# Patient Record
Sex: FEMALE | Race: WHITE | NOT HISPANIC OR LATINO | Employment: UNEMPLOYED | URBAN - METROPOLITAN AREA
[De-identification: names, ages, dates, MRNs, and addresses within clinical notes are randomized per-mention and may not be internally consistent; named-entity substitution may affect disease eponyms.]

---

## 2017-01-02 ENCOUNTER — HOSPITAL ENCOUNTER (EMERGENCY)
Facility: HOSPITAL | Age: 22
Discharge: HOME | End: 2017-01-02
Attending: EMERGENCY MEDICINE | Admitting: EMERGENCY MEDICINE
Payer: COMMERCIAL

## 2017-01-02 VITALS
TEMPERATURE: 98.2 F | HEART RATE: 103 BPM | WEIGHT: 120 LBS | DIASTOLIC BLOOD PRESSURE: 60 MMHG | OXYGEN SATURATION: 97 % | SYSTOLIC BLOOD PRESSURE: 120 MMHG | RESPIRATION RATE: 18 BRPM

## 2017-01-02 DIAGNOSIS — H66.91 RIGHT OTITIS MEDIA: Primary | ICD-10-CM

## 2017-01-02 PROCEDURE — 99282 EMERGENCY DEPT VISIT SF MDM: CPT

## 2017-01-02 RX ORDER — AZITHROMYCIN 250 MG/1
250 TABLET, FILM COATED ORAL DAILY
Qty: 5 TABLET | Refills: 0 | Status: SHIPPED | OUTPATIENT
Start: 2017-01-02 | End: 2017-01-07

## 2017-01-02 RX ORDER — NEOMYCIN SULFATE, POLYMYXIN B SULFATE AND HYDROCORTISONE 10; 3.5; 1 MG/ML; MG/ML; [USP'U]/ML
4 SUSPENSION/ DROPS AURICULAR (OTIC) 3 TIMES DAILY
Qty: 6 ML | Refills: 0 | Status: SHIPPED | OUTPATIENT
Start: 2017-01-02 | End: 2017-01-12

## 2018-01-31 ENCOUNTER — OFFICE VISIT (OUTPATIENT)
Dept: URGENT CARE | Age: 23
End: 2018-01-31
Payer: COMMERCIAL

## 2018-01-31 VITALS
BODY MASS INDEX: 24.19 KG/M2 | HEIGHT: 59 IN | DIASTOLIC BLOOD PRESSURE: 79 MMHG | HEART RATE: 95 BPM | RESPIRATION RATE: 20 BRPM | TEMPERATURE: 98.8 F | WEIGHT: 120 LBS | OXYGEN SATURATION: 99 % | SYSTOLIC BLOOD PRESSURE: 138 MMHG

## 2018-01-31 DIAGNOSIS — J02.9 SORE THROAT: Primary | ICD-10-CM

## 2018-01-31 DIAGNOSIS — J11.1 INFLUENZA: ICD-10-CM

## 2018-01-31 LAB — S PYO AG THROAT QL: NEGATIVE

## 2018-01-31 PROCEDURE — 99213 OFFICE O/P EST LOW 20 MIN: CPT | Performed by: FAMILY MEDICINE

## 2018-01-31 RX ORDER — OSELTAMIVIR PHOSPHATE 75 MG/1
75 CAPSULE ORAL EVERY 12 HOURS SCHEDULED
Qty: 10 CAPSULE | Refills: 0 | Status: SHIPPED | OUTPATIENT
Start: 2018-01-31 | End: 2018-02-05

## 2018-01-31 RX ORDER — ALBUTEROL SULFATE 90 UG/1
AEROSOL, METERED RESPIRATORY (INHALATION)
COMMUNITY
Start: 2015-10-09 | End: 2018-01-31 | Stop reason: SDUPTHER

## 2018-02-01 NOTE — PATIENT INSTRUCTIONS
Rest and drink extra fluids  Start Tamiflu  OTC cough and cold medications as needed  Follow up with PCP if no improvement  Go to ER with worsening symptoms  Influenza   WHAT YOU NEED TO KNOW:   Influenza (the flu) is an infection caused by the influenza virus  The flu is easily spread when an infected person coughs, sneezes, or has close contact with others  You may be able to spread the flu to others for 1 week or longer after signs or symptoms appear  DISCHARGE INSTRUCTIONS:   Call 911 for any of the following:   · You have trouble breathing, and your lips look purple or blue  · You have a seizure  Return to the emergency department if:   · You are dizzy, or you are urinating less or not at all  · You have a headache with a stiff neck, and you feel tired or confused  · You have new pain or pressure in your chest     · Your symptoms, such as shortness of breath, vomiting, or diarrhea, get worse  · Your symptoms, such as fever and coughing, seem to get better, but then get worse  Contact your healthcare provider if:   · You have new muscle pain or weakness  · You have questions or concerns about your condition or care  Medicines: You may need any of the following:  · Acetaminophen  decreases pain and fever  It is available without a doctor's order  Ask how much to take and how often to take it  Follow directions  Acetaminophen can cause liver damage if not taken correctly  · NSAIDs , such as ibuprofen, help decrease swelling, pain, and fever  This medicine is available with or without a doctor's order  NSAIDs can cause stomach bleeding or kidney problems in certain people  If you take blood thinner medicine, always ask your healthcare provider if NSAIDs are safe for you  Always read the medicine label and follow directions  · Antivirals  help fight a viral infection  · Take your medicine as directed    Contact your healthcare provider if you think your medicine is not helping or if you have side effects  Tell him or her if you are allergic to any medicine  Keep a list of the medicines, vitamins, and herbs you take  Include the amounts, and when and why you take them  Bring the list or the pill bottles to follow-up visits  Carry your medicine list with you in case of an emergency  Rest  as much as you can to help you recover  Drink liquids as directed  to help prevent dehydration  Ask how much liquid to drink each day and which liquids are best for you  Prevent the spread of influenza:   · Wash your hands often  Use soap and water  Wash your hands after you use the bathroom, change a child's diapers, or sneeze  Wash your hands before you prepare or eat food  Use gel hand cleanser when soap and water are not available  Do not touch your eyes, nose, or mouth unless you have washed your hands first            · Cover your mouth when you sneeze or cough  Cough into a tissue or the bend of your arm  · Clean shared items with a germ-killing   Clean table surfaces, doorknobs, and light switches  Do not share towels, silverware, and dishes with people who are sick  Wash bed sheets, towels, silverware, and dishes with soap and water  · Wear a mask  over your mouth and nose if you are sick or are near anyone who is sick  · Stay away from others  if you are sick  · Influenza vaccine  helps prevent influenza (flu)  Everyone older than 6 months should get a yearly influenza vaccine  Get the vaccine as soon as it is available, usually in September or October each year  Follow up with your healthcare provider as directed:  Write down your questions so you remember to ask them during your visits  © 2017 [address] [employer] Information is for End User's use only and may not be sold, redistributed or otherwise used for commercial purposes   All illustrations and images included in CareNotes® are the copyrighted property of [employer]  or [employer]  The above information is an  only  It is not intended as medical advice for individual conditions or treatments  Talk to your doctor, nurse or pharmacist before following any medical regimen to see if it is safe and effective for you

## 2018-02-02 NOTE — PROGRESS NOTES
[facility] Now        NAME: Mary Yu is a 25 y o  female  : 1995    MRN: [id number]  DATE: 2018  TIME: 12:06 PM    Assessment and Plan   Sore throat [J02 9]  1  Sore throat  POCT rapid strepA   2  Influenza  oseltamivir (TAMIFLU) 75 mg capsule         Patient Instructions     Follow up with PCP in 3-5 days  Proceed to  ER if symptoms worsen  Chief Complaint     Chief Complaint   Patient presents with    Headache     for 1 day   Sore Throat    Fever         History of Present Illness   Mary Yu presents to the clinic c/o    This is a 25year old female here today with sore throat, fever, cough, and headache  Symptoms started today  She has not been taking anything thing  Review of Systems   Review of Systems   Constitutional: Positive for chills, fatigue and fever  HENT: Positive for congestion and sore throat  Respiratory: Positive for cough  Cardiovascular: Negative for chest pain  Gastrointestinal: Negative for diarrhea, nausea and vomiting  Skin: Negative for rash           Current Medications     Long-Term Prescriptions   Medication Sig Dispense Refill    neomycin-polymyxin-hydrocortisone (CORTISPORIN) 0 35%-10,000 units/mL-1% otic suspension Administer 4 drops into ears 3 (three) times a day for 10 days 6 mL 0       Current Allergies     Allergies as of 2018 - Reviewed 2018   Allergen Reaction Noted    Erythromycin  2016    Erythromycin base Nausea Only 2015    Lidocaine  2016    Other  2015            The following portions of the patient's history were reviewed and updated as appropriate: allergies, current medications, past family history, past medical history, past social history, past surgical history and problem list     Objective   /79   Pulse 95   Temp 98 8 °F (37 1 °C) (Temporal)   Resp 20   Ht 4' 11" (1 499 m)   Wt 54 4 kg (120 lb)   LMP 2018 (Approximate)   SpO2 99%   BMI 24 24 kg/m²        Physical Exam     Physical Exam   Constitutional: She is oriented to person, place, and time  She appears well-developed and well-nourished  HENT:   Head: Normocephalic  Posterior pharynx slightly erythemic no exudate, no enlarged tonsils  Neck: Normal range of motion  Neck supple  Cardiovascular: Normal rate and regular rhythm  Neurological: She is alert and oriented to person, place, and time

## 2018-10-28 ENCOUNTER — OFFICE VISIT (OUTPATIENT)
Dept: URGENT CARE | Age: 23
End: 2018-10-28
Payer: COMMERCIAL

## 2018-10-28 VITALS
TEMPERATURE: 98.4 F | OXYGEN SATURATION: 98 % | BODY MASS INDEX: 25.2 KG/M2 | RESPIRATION RATE: 16 BRPM | DIASTOLIC BLOOD PRESSURE: 78 MMHG | HEART RATE: 100 BPM | SYSTOLIC BLOOD PRESSURE: 118 MMHG | WEIGHT: 125 LBS | HEIGHT: 59 IN

## 2018-10-28 DIAGNOSIS — J01.00 ACUTE MAXILLARY SINUSITIS, RECURRENCE NOT SPECIFIED: Primary | ICD-10-CM

## 2018-10-28 PROCEDURE — 99213 OFFICE O/P EST LOW 20 MIN: CPT | Performed by: FAMILY MEDICINE

## 2018-10-28 RX ORDER — FLUTICASONE PROPIONATE 50 MCG
1 SPRAY, SUSPENSION (ML) NASAL DAILY
Qty: 16 G | Refills: 0 | Status: SHIPPED | OUTPATIENT
Start: 2018-10-28

## 2018-10-28 RX ORDER — AMOXICILLIN AND CLAVULANATE POTASSIUM 875; 125 MG/1; MG/1
1 TABLET, FILM COATED ORAL EVERY 12 HOURS SCHEDULED
Qty: 14 TABLET | Refills: 0 | Status: SHIPPED | OUTPATIENT
Start: 2018-10-28 | End: 2018-11-04

## 2018-10-28 RX ORDER — DESOGESTREL AND ETHINYL ESTRADIOL 0.15-0.03
1 KIT ORAL DAILY
COMMUNITY

## 2018-10-28 NOTE — PATIENT INSTRUCTIONS
Take antibiotics as prescribed  Use Flonase as prescribed  Age appropriate over-the-counter symptomatic treatment if needed, read all ingredients and do not duplicate medications  Stay hydrated with lots of water/fluids  Follow-up with PCP in the next few days for reexamination and to ensure resolution of symptoms  Go to the ED if any intractable fevers, unable to stay hydrated, abdominal pain, chest pain, shortness of breath, new or worsening symptoms or other concerning symptoms

## 2018-10-28 NOTE — PROGRESS NOTES
[facility] Now        NAME: Taylor Logan is a 21 y o  female  : 1995    MRN: [id number]  DATE: 2018  TIME: 7:12 PM    Assessment and Plan   Acute maxillary sinusitis, recurrence not specified [J01 00]  1  Acute maxillary sinusitis, recurrence not specified  amoxicillin-clavulanate (AUGMENTIN) 875-125 mg per tablet    fluticasone (FLONASE) 50 mcg/act nasal spray         Patient Instructions     Take antibiotics as prescribed  Use Flonase as prescribed  Age appropriate over-the-counter symptomatic treatment if needed, read all ingredients and do not duplicate medications  Stay hydrated with lots of water/fluids  Follow-up with PCP in the next few days for reexamination and to ensure resolution of symptoms  Go to the ED if any intractable fevers, unable to stay hydrated, abdominal pain, chest pain, shortness of breath, new or worsening symptoms or other concerning symptoms  Chief Complaint     Chief Complaint   Patient presents with    Sore Throat    Sinusitis         History of Present Illness       Sinusitis   This is a new problem  The current episode started in the past 7 days  The problem is unchanged  There has been no fever  Her pain is at a severity of 4/10  The pain is mild  Associated symptoms include congestion, coughing (dry, from PND), ear pain, sinus pressure and a sore throat  Pertinent negatives include no chills, diaphoresis, headaches, hoarse voice, neck pain or shortness of breath  Treatments tried: Tylenol cold and Sinus  The treatment provided mild relief  States that feels like prior sinus infections  Denies pregnancy risk    Review of Systems   Review of Systems   Constitutional: Negative for chills, diaphoresis and fever  HENT: Positive for congestion, ear pain, rhinorrhea, sinus pain, sinus pressure and sore throat  Negative for facial swelling, hoarse voice, trouble swallowing and voice change      Eyes: Negative for discharge, itching and visual disturbance  Respiratory: Positive for cough (dry, from PND)  Negative for shortness of breath and wheezing  Cardiovascular: Negative for chest pain and leg swelling  Gastrointestinal: Negative for abdominal pain, diarrhea, nausea and vomiting  Musculoskeletal: Negative for back pain and neck pain  Skin: Negative for rash  Neurological: Negative for dizziness, syncope, weakness, numbness and headaches  All other systems reviewed and are negative  Current Medications       Current Outpatient Prescriptions:     albuterol (PROVENTIL HFA,VENTOLIN HFA) 90 mcg/act inhaler, Inhale 2 puffs every 4 (four) hours as needed for wheezing , Disp: 1 Inhaler, Rfl: 0    desogestrel-ethinyl estradiol (APRI) 0 15-30 MG-MCG per tablet, Take 1 tablet by mouth daily, Disp: , Rfl:     amoxicillin-clavulanate (AUGMENTIN) 875-125 mg per tablet, Take 1 tablet by mouth every 12 (twelve) hours for 7 days, Disp: 14 tablet, Rfl: 0    fluticasone (FLONASE) 50 mcg/act nasal spray, 1 spray into each nostril daily, Disp: 16 g, Rfl: 0    neomycin-polymyxin-hydrocortisone (CORTISPORIN) 0 35%-10,000 units/mL-1% otic suspension, Administer 4 drops into ears 3 (three) times a day for 10 days, Disp: 6 mL, Rfl: 0    Current Allergies     Allergies as of 10/28/2018 - Reviewed 10/28/2018   Allergen Reaction Noted    Erythromycin  09/16/2016    Erythromycin base Nausea Only 11/16/2015    Lidocaine  09/16/2016    Other  11/16/2015            The following portions of the patient's history were reviewed and updated as appropriate: allergies, current medications, past family history, past medical history, past social history, past surgical history and problem list      Past Medical History:   Diagnosis Date    Arthritis     Asthma     Lupus     8 years    No known problems        Past Surgical History:   Procedure Laterality Date    NO PAST SURGERIES         History reviewed   No pertinent family history  Medications have been verified  Objective   /78 (BP Location: Left arm, Patient Position: Sitting, Cuff Size: Standard)   Pulse 100   Temp 98 4 °F (36 9 °C) (Temporal)   Resp 16   Ht 4' 11" (1 499 m)   Wt 56 7 kg (125 lb)   SpO2 98%   BMI 25 25 kg/m²        Physical Exam     Physical Exam   Constitutional: She is oriented to person, place, and time  She appears well-developed and well-nourished  HENT:   Head: Normocephalic and atraumatic  Right Ear: Hearing, tympanic membrane, external ear and ear canal normal  No mastoid tenderness  Left Ear: Hearing, tympanic membrane, external ear and ear canal normal  No mastoid tenderness  Mouth/Throat: Uvula is midline and mucous membranes are normal  No uvula swelling  Posterior oropharyngeal erythema present  Mild postnasal drip, mild frontal and maxillary sinus tenderness to palpation, no swelling, erythema or warmth  Boggy nasal mucosa, mild clear rhinorrhea   Eyes: Pupils are equal, round, and reactive to light  EOM are normal    Neck: Normal range of motion  Neck supple  Cardiovascular: Normal rate, regular rhythm and normal heart sounds  Pulmonary/Chest: Effort normal and breath sounds normal  No respiratory distress  She has no wheezes  Musculoskeletal: Normal range of motion  Neurological: She is alert and oriented to person, place, and time  Skin: Skin is warm and dry  No rash noted  Psychiatric: She has a normal mood and affect  Her behavior is normal    Nursing note and vitals reviewed

## 2018-12-27 ENCOUNTER — OFFICE VISIT (OUTPATIENT)
Dept: URGENT CARE | Age: 23
End: 2018-12-27
Payer: COMMERCIAL

## 2018-12-27 VITALS
TEMPERATURE: 98.4 F | DIASTOLIC BLOOD PRESSURE: 80 MMHG | HEIGHT: 59 IN | WEIGHT: 121 LBS | OXYGEN SATURATION: 99 % | RESPIRATION RATE: 16 BRPM | HEART RATE: 108 BPM | SYSTOLIC BLOOD PRESSURE: 131 MMHG | BODY MASS INDEX: 24.39 KG/M2

## 2018-12-27 DIAGNOSIS — J20.8 ACUTE BACTERIAL BRONCHITIS: Primary | ICD-10-CM

## 2018-12-27 DIAGNOSIS — B96.89 ACUTE BACTERIAL BRONCHITIS: Primary | ICD-10-CM

## 2018-12-27 PROCEDURE — 99213 OFFICE O/P EST LOW 20 MIN: CPT | Performed by: FAMILY MEDICINE

## 2018-12-27 RX ORDER — AZITHROMYCIN 250 MG/1
TABLET, FILM COATED ORAL
Qty: 6 TABLET | Refills: 0 | Status: SHIPPED | OUTPATIENT
Start: 2018-12-27 | End: 2018-12-31

## 2018-12-28 NOTE — PROGRESS NOTES
[facility] Now        NAME: Mariaelena Medina is a 21 y o  female  : 1995    MRN: [id number]  DATE: 2018  TIME: 8:45 PM    Assessment and Plan   Acute bacterial bronchitis [J20 8, B96 89]  1  Acute bacterial bronchitis  azithromycin (ZITHROMAX) 250 mg tablet         Patient Instructions       Follow up with PCP in 3-5 days  Proceed to  ER if symptoms worsen  Chief Complaint     Chief Complaint   Patient presents with    Cold Like Symptoms     pt c/o stuffy nose, headache, chest congestion and green/thick mucus x3 days, pt believes she had a fever this morning as well but did not take it          History of Present Illness       Patient here for evaluation of congestion, cough, thick green sputum  Patient's symptoms started a few days ago getting progressively worse          Review of Systems   Review of Systems      Current Medications       Current Outpatient Prescriptions:     albuterol (PROVENTIL HFA,VENTOLIN HFA) 90 mcg/act inhaler, Inhale 2 puffs every 4 (four) hours as needed for wheezing , Disp: 1 Inhaler, Rfl: 0    azithromycin (ZITHROMAX) 250 mg tablet, Take 2 tablets day 1 then 1 tab days 2-5, Disp: 6 tablet, Rfl: 0    desogestrel-ethinyl estradiol (APRI) 0 15-30 MG-MCG per tablet, Take 1 tablet by mouth daily, Disp: , Rfl:     fluticasone (FLONASE) 50 mcg/act nasal spray, 1 spray into each nostril daily, Disp: 16 g, Rfl: 0    neomycin-polymyxin-hydrocortisone (CORTISPORIN) 0 35%-10,000 units/mL-1% otic suspension, Administer 4 drops into ears 3 (three) times a day for 10 days, Disp: 6 mL, Rfl: 0    Current Allergies     Allergies as of 2018 - Reviewed 2018   Allergen Reaction Noted    Erythromycin  2016    Erythromycin base Nausea Only 2015    Lidocaine  2016    Other  2015            The following portions of the patient's history were reviewed and updated as appropriate: allergies, current medications, past family history, past medical history, past social history, past surgical history and problem list      Past Medical History:   Diagnosis Date    Arthritis     Asthma     Lupus     8 years    No known problems        Past Surgical History:   Procedure Laterality Date    NO PAST SURGERIES         No family history on file  Medications have been verified  Objective   /80 (BP Location: Left arm, Patient Position: Sitting, Cuff Size: Standard)   Pulse (!) 108   Temp 98 4 °F (36 9 °C) (Temporal)   Resp 16   Ht 4' 11" (1 499 m)   Wt 54 9 kg (121 lb)   SpO2 99%   BMI 24 44 kg/m²        Physical Exam     Physical Exam   Constitutional: She is oriented to person, place, and time  She appears well-developed and well-nourished  No distress  HENT:   Head: Normocephalic and atraumatic  Right Ear: External ear normal    Left Ear: External ear normal    Mouth/Throat: Oropharynx is clear and moist  No oropharyngeal exudate  Bilateral nasal congestion and erythema with no discharge  Eyes: Pupils are equal, round, and reactive to light  Conjunctivae and EOM are normal    Pulmonary/Chest: Effort normal  No respiratory distress  She has no wheezes  She has no rales  Coarse breath sounds bilateral upper airway   Lymphadenopathy:     She has cervical adenopathy  Neurological: She is alert and oriented to person, place, and time  Skin: Skin is warm and dry  Psychiatric: She has a normal mood and affect  Her behavior is normal    Nursing note and vitals reviewed

## 2019-12-29 ENCOUNTER — HOSPITAL ENCOUNTER (EMERGENCY)
Facility: HOSPITAL | Age: 24
Discharge: HOME | End: 2019-12-29
Attending: EMERGENCY MEDICINE | Admitting: EMERGENCY MEDICINE
Payer: COMMERCIAL

## 2019-12-29 VITALS
SYSTOLIC BLOOD PRESSURE: 114 MMHG | WEIGHT: 130.95 LBS | OXYGEN SATURATION: 96 % | RESPIRATION RATE: 16 BRPM | DIASTOLIC BLOOD PRESSURE: 73 MMHG | TEMPERATURE: 98.2 F | HEART RATE: 110 BPM | BODY MASS INDEX: 26.45 KG/M2

## 2019-12-29 DIAGNOSIS — H66.92 LEFT OTITIS MEDIA: Primary | ICD-10-CM

## 2019-12-29 LAB
BACTERIA UR QL AUTO: ABNORMAL /HPF
BILIRUB UR QL STRIP: NEGATIVE
CLARITY UR: CLEAR
COLOR UR: ABNORMAL
EXT PREG TEST URINE: NEGATIVE
EXT. CONTROL ED NAV: NORMAL
GLUCOSE UR STRIP-MCNC: NEGATIVE MG/DL
HGB UR QL STRIP.AUTO: ABNORMAL
KETONES UR STRIP-MCNC: NEGATIVE MG/DL
LEUKOCYTE ESTERASE UR QL STRIP: NEGATIVE
NITRITE UR QL STRIP: NEGATIVE
NON-SQ EPI CELLS URNS QL MICRO: ABNORMAL /HPF
PH UR STRIP.AUTO: 6 [PH]
PROT UR STRIP-MCNC: NEGATIVE MG/DL
RBC #/AREA URNS AUTO: ABNORMAL /HPF
SP GR UR STRIP.AUTO: 1.02 (ref 1–1.03)
UROBILINOGEN UR QL STRIP.AUTO: 0.2 E.U./DL
WBC #/AREA URNS AUTO: ABNORMAL /HPF

## 2019-12-29 PROCEDURE — 81025 URINE PREGNANCY TEST: CPT | Performed by: EMERGENCY MEDICINE

## 2019-12-29 PROCEDURE — 99283 EMERGENCY DEPT VISIT LOW MDM: CPT

## 2019-12-29 PROCEDURE — 99283 EMERGENCY DEPT VISIT LOW MDM: CPT | Performed by: EMERGENCY MEDICINE

## 2019-12-29 PROCEDURE — 96372 THER/PROPH/DIAG INJ SC/IM: CPT

## 2019-12-29 PROCEDURE — 81001 URINALYSIS AUTO W/SCOPE: CPT | Performed by: EMERGENCY MEDICINE

## 2019-12-29 RX ORDER — AMOXICILLIN AND CLAVULANATE POTASSIUM 875; 125 MG/1; MG/1
1 TABLET, FILM COATED ORAL ONCE
Status: DISCONTINUED | OUTPATIENT
Start: 2019-12-29 | End: 2019-12-29

## 2019-12-29 RX ORDER — AMOXICILLIN AND CLAVULANATE POTASSIUM 875; 125 MG/1; MG/1
1 TABLET, FILM COATED ORAL EVERY 12 HOURS
Qty: 20 TABLET | Refills: 0 | Status: SHIPPED | OUTPATIENT
Start: 2019-12-29 | End: 2020-01-08

## 2019-12-29 RX ORDER — KETOROLAC TROMETHAMINE 30 MG/ML
15 INJECTION, SOLUTION INTRAMUSCULAR; INTRAVENOUS ONCE
Status: COMPLETED | OUTPATIENT
Start: 2019-12-29 | End: 2019-12-29

## 2019-12-29 RX ADMIN — KETOROLAC TROMETHAMINE 15 MG: 30 INJECTION, SOLUTION INTRAMUSCULAR at 21:17

## 2019-12-29 RX ADMIN — AMOXICILLIN AND CLAVULANATE POTASSIUM 1 TABLET: 875; 125 TABLET, FILM COATED ORAL at 21:17

## 2019-12-30 NOTE — ED PROVIDER NOTES
History  Chief Complaint   Patient presents with   Parisa Micker     left ear pain; hx bronchitis and pneumonia recently and states in the last 24hours pain has worsened in left ear; also reports 5 weeks late for period and chance of pregnancy     26 y/o female presents to the ED for left ear pain  Patient states that she has had intermittent cold symptoms for 1 5 months  States that she was on antibiotics for pneumonia 1 month ago  She states that symptoms have since improved but left ear pain has worsened since yesterday  She has not tried anything for the symptoms  Denies any f/c, recent swimming/ water in ears, sore throat, or frequent ear infections  No other complaints  History provided by:  Patient  Earache   Location:  Left  Behind ear:  No abnormality  Quality:  Throbbing  Severity:  Moderate  Onset quality:  Sudden  Duration:  1 day  Timing:  Constant  Progression:  Worsening  Chronicity:  New  Context: recent URI    Relieved by:  None tried  Worsened by:  Nothing  Ineffective treatments:  None tried  Associated symptoms: congestion    Associated symptoms: no abdominal pain, no cough, no diarrhea, no fever, no headaches, no neck pain, no rash, no sore throat and no vomiting        Prior to Admission Medications   Prescriptions Last Dose Informant Patient Reported? Taking? albuterol (PROVENTIL HFA,VENTOLIN HFA) 90 mcg/act inhaler   No No   Sig: Inhale 2 puffs every 4 (four) hours as needed for wheezing     desogestrel-ethinyl estradiol (APRI) 0 15-30 MG-MCG per tablet   Yes No   Sig: Take 1 tablet by mouth daily   fluticasone (FLONASE) 50 mcg/act nasal spray   No No   Si spray into each nostril daily   neomycin-polymyxin-hydrocortisone (CORTISPORIN) 0 35%-10,000 units/mL-1% otic suspension   No No   Sig: Administer 4 drops into ears 3 (three) times a day for 10 days      Facility-Administered Medications: None       Past Medical History:   Diagnosis Date    Arthritis     Asthma     Lupus ([facility] [address] ) 8 years    No known problems        Past Surgical History:   Procedure Laterality Date    NO PAST SURGERIES         History reviewed  No pertinent family history  I have reviewed and agree with the history as documented  Social History     Tobacco Use    Smoking status: Never Smoker    Smokeless tobacco: Never Used   Substance Use Topics    Alcohol use: No    Drug use: No        Review of Systems   Constitutional: Negative for chills and fever  HENT: Positive for congestion and ear pain  Negative for sore throat  Eyes: Negative for pain and visual disturbance  Respiratory: Negative for cough, shortness of breath and wheezing  Cardiovascular: Negative for chest pain and leg swelling  Gastrointestinal: Negative for abdominal pain, diarrhea, nausea and vomiting  Genitourinary: Negative for dysuria, frequency, hematuria and urgency  Musculoskeletal: Negative for neck pain and neck stiffness  Skin: Negative for rash and wound  Neurological: Negative for weakness, numbness and headaches  Psychiatric/Behavioral: Negative for agitation and confusion  All other systems reviewed and are negative  Physical Exam  Physical Exam   Constitutional: She is oriented to person, place, and time  She appears well-developed and well-nourished  HENT:   Head: Normocephalic and atraumatic  Right Ear: Tympanic membrane normal    Left Ear: Tympanic membrane is erythematous and bulging  Mouth/Throat: Oropharynx is clear and moist    Eyes: Pupils are equal, round, and reactive to light  EOM are normal    Neck: Normal range of motion  Neck supple  Cardiovascular: Normal rate and regular rhythm  Pulmonary/Chest: Effort normal and breath sounds normal  No stridor  No respiratory distress  She has no wheezes  She has no rales  Abdominal: Soft  Bowel sounds are normal  She exhibits no distension  There is no tenderness  Musculoskeletal: Normal range of motion     Neurological: She is alert and oriented to person, place, and time  No focal deficits   Skin: Skin is warm and dry  Nursing note and vitals reviewed  Vital Signs  ED Triage Vitals [12/29/19 2048]   Temperature Pulse Respirations Blood Pressure SpO2   98 2 °F (36 8 °C) (!) 110 16 114/73 96 %      Temp Source Heart Rate Source Patient Position - Orthostatic VS BP Location FiO2 (%)   Oral Monitor Lying Right arm --      Pain Score       Worst Possible Pain           Vitals:    12/29/19 2048   BP: 114/73   Pulse: (!) 110   Patient Position - Orthostatic VS: Lying         Visual Acuity      ED Medications  Medications   ketorolac (TORADOL) injection 15 mg (15 mg Intramuscular Given 12/29/19 2117)       Diagnostic Studies  Results Reviewed     Procedure Component Value Units Date/Time    Urine Microscopic [[id number]]  (Abnormal) Collected:  12/29/19 2112    Lab Status:  Final result Specimen:  Urine, Clean Catch Updated:  12/29/19 2131     RBC, UA 0-1 /hpf      WBC, UA 1-2 /hpf      Epithelial Cells Occasional /hpf      Bacteria, UA Occasional /hpf     UA w Reflex to Microscopic w Reflex to Culture [[id number]]  (Abnormal) Collected:  12/29/19 2112    Lab Status:  Final result Specimen:  Urine, Clean Catch Updated:  12/29/19 2122     Color, UA Light Yellow     Clarity, UA Clear     Specific [place], UA 1 020     pH, UA 6 0     Leukocytes, UA Negative     Nitrite, UA Negative     Protein, UA Negative mg/dl      Glucose, UA Negative mg/dl      Ketones, UA Negative mg/dl      Urobilinogen, UA 0 2 E U /dl      Bilirubin, UA Negative     Blood, UA Trace-Intact    POCT pregnancy, urine [[id number]]  (Normal) Resulted:  12/29/19 2115    Lab Status:  Final result Updated:  12/29/19 2115     EXT PREG TEST UR (Ref: Negative) negative     Control valid                 No orders to display              Procedures  Procedures         ED Course  ED Course as of Dec 29 2133   Sun Dec 29, 2019   2101 Left ear pain intermittent x few weeks   Worse in the last 24 hours  No fever  Has a hx of frequent ear infections- last time was 1 5 yrs ago  MDM  Number of Diagnoses or Management Options  Left otitis media: new and requires workup  Diagnosis management comments: Patient with left ear pain- will give antibiotic and instruct her to follow up with her PCP    Patient reevaluated and feels improved  Discharge instructions given including medications, follow-up, and return precautions  Patient demonstrates verbal understanding and agrees with plan  Amount and/or Complexity of Data Reviewed  Clinical lab tests: ordered and reviewed  Tests in the radiology section of CPT®: ordered and reviewed  Tests in the medicine section of CPT®: ordered and reviewed  Discussion of test results with the performing providers: yes  Decide to obtain previous medical records or to obtain history from someone other than the patient: yes  Obtain history from someone other than the patient: yes  Review and summarize past medical records: yes  Discuss the patient with other providers: yes  Independent visualization of images, tracings, or specimens: yes    Patient Progress  Patient progress: improved        Disposition  Final diagnoses:   Left otitis media     Time reflects when diagnosis was documented in both MDM as applicable and the Disposition within this note     Time User Action Codes Description Comment    12/29/2019  9:30 PM Alex Ayala Add [H66 92] Left otitis media       ED Disposition     ED Disposition Condition Date/Time Comment    Discharge Stable Sun Dec 29, 2019  9:18 PM Anitha Bradford discharge to home/self care              Follow-up Information     Follow up With Specialties Details Why Contact Info Additional Information    [employer]  Call  to establish a family doctor if you do not already have one [address] [facility] Emergency Department Emergency Medicine Go to  immediately for any new or worsening symptoms  [address] [facility] [address], [address] [place], [place], [facility]          Patient's Medications   Discharge Prescriptions    AMOXICILLIN-CLAVULANATE (AUGMENTIN) 875-125 MG PER TABLET    Take 1 tablet by mouth every 12 (twelve) hours for 10 days       Start Date: 12/29/2019End Date: 1/8/2020       Order Dose: 1 tablet       Quantity: 20 tablet    Refills: 0     No discharge procedures on file      ED Provider  Electronically Signed by           Alysha Crawford DO  12/29/19 1199

## 2023-04-24 ENCOUNTER — OFFICE VISIT (OUTPATIENT)
Dept: URGENT CARE | Facility: CLINIC | Age: 28
End: 2023-04-24

## 2023-04-24 VITALS
TEMPERATURE: 98.1 F | DIASTOLIC BLOOD PRESSURE: 77 MMHG | OXYGEN SATURATION: 98 % | HEART RATE: 95 BPM | RESPIRATION RATE: 17 BRPM | SYSTOLIC BLOOD PRESSURE: 105 MMHG

## 2023-04-24 DIAGNOSIS — H66.91 RIGHT OTITIS MEDIA, UNSPECIFIED OTITIS MEDIA TYPE: Primary | ICD-10-CM

## 2023-04-24 RX ORDER — AMOXICILLIN 500 MG/1
500 CAPSULE ORAL EVERY 12 HOURS SCHEDULED
Qty: 14 CAPSULE | Refills: 0 | Status: SHIPPED | OUTPATIENT
Start: 2023-04-24 | End: 2023-05-01

## 2023-04-24 NOTE — PROGRESS NOTES
[facility]        NAME: Davis Middleton is a 32 y o  female  : 1995    MRN: [id number]  DATE: 2023  TIME: 12:23 PM    Assessment and Plan   Right otitis media, unspecified otitis media type [H66 91]  1  Right otitis media, unspecified otitis media type  amoxicillin (AMOXIL) 500 mg capsule            Patient Instructions       Follow up with PCP in 3-5 days  Proceed to  ER if symptoms worsen  Chief Complaint     Chief Complaint   Patient presents with   • Earache     Pt c/o right-sided earache and slight sore throat for 2 days  Pt had fever 101 1 yesterday and subsided with motrin  History of Present Illness       Patient is a 33 yo female who presents for evaluation of right-sided ear pain x 2 day  Also reports some sore throat and a fever of 101 last night  She denies rash, abdominal pain, nausea, vomiting, dysphagia, body aches, headaches, dizziness       Review of Systems   Review of Systems   Constitutional: Positive for fever  HENT: Positive for ear pain and sore throat  Negative for ear discharge, facial swelling, hearing loss, trouble swallowing and voice change  Gastrointestinal: Negative for abdominal pain, nausea and vomiting  Musculoskeletal: Negative for arthralgias and myalgias  Skin: Negative for rash  Neurological: Negative for dizziness and headaches           Current Medications       Current Outpatient Medications:   •  albuterol (PROVENTIL HFA,VENTOLIN HFA) 90 mcg/act inhaler, Inhale 2 puffs every 4 (four) hours as needed for wheezing , Disp: 1 Inhaler, Rfl: 0  •  amoxicillin (AMOXIL) 500 mg capsule, Take 1 capsule (500 mg total) by mouth every 12 (twelve) hours for 7 days, Disp: 14 capsule, Rfl: 0  •  desogestrel-ethinyl estradiol (APRI) 0 15-30 MG-MCG per tablet, Take 1 tablet by mouth daily, Disp: , Rfl:   •  fluticasone (FLONASE) 50 mcg/act nasal spray, 1 spray into each nostril daily, Disp: 16 g, Rfl: 0  •  neomycin-polymyxin-hydrocortisone [facility]  Progress Note - Sophia Oro 1948, 67 y o  female MRN: [id number]  Unit/Bed#: -01 Encounter: [id number]  Primary Care Provider: Cristina Ramsey MD   Date and time admitted to hospital: 3/21/2021 10:27 PM    Delirium  Assessment & Plan  Acute delirium, for which she received 16mg ativan overnight based off CIWA protocol  patient's family denies heavy alcohol abuse, she takes maybe a glass of wine occasionally, so it is unlikely she is in active alcohol withdrawal - more like severe delirium  CT head - No acute intracranial anomaly,  CIWA protocol discontinued 3/24 as patient is being overmedicated  Trial of phenobarb x 1  Patient received only one dose of fentanyl overnight, Mentation improving - more interactive this am  C/w haldol Prn  QTC interval - 468  continue with IV antibiotics for pneumonia treatment    * Sepsis ([facility] [address] )  Assessment & Plan  Sepsis, poa, a/e/b leukocytosis 36k with 4% bands and tachycardia secondary to right sided pneumonia/large right parapneumonic effusion  INR 8 77 on admission  S/p Vit K, INR now 1 27  S/p IR chest tube placement 3/23/21 - now on tpa/dornase BID x 3 days  Fluid cultures - rare gram positive cocci, pending sensitivities  On IV antibiotics - Ceftriaxone, flagyl and vancomycin  urine Legionella and Streptococcus - negative  Trend WBC and fever curve - wbc improving, 20k  Infectious disease on board  Blood cultures negative till date      Pneumonia of right lower lobe due to infectious organism  Assessment & Plan  Right lower lobe pneumonia with parapneumonic effusion  CT chest showed- No evidence of pulmonary artery embolus  Consolidation versus mass in the right lower lobe      On Rocephin, Zithromax and Flagyl  urine Legionella and Streptococcus negative  Oxygen supplementation and bronchodilators  Pulmonary on board    Pleural effusion on right  Assessment & Plan  CT chest - large right-sided complicated parapenumonic (CORTISPORIN) 0 35%-10,000 units/mL-1% otic suspension, Administer 4 drops into ears 3 (three) times a day for 10 days, Disp: 6 mL, Rfl: 0    Current Allergies     Allergies as of 04/24/2023 - Reviewed 04/24/2023   Allergen Reaction Noted   • Erythromycin  09/16/2016   • Erythromycin base Nausea Only 11/16/2015   • Lidocaine  09/16/2016   • Other  11/16/2015            The following portions of the patient's history were reviewed and updated as appropriate: allergies, current medications, past family history, past medical history, past social history, past surgical history and problem list      Past Medical History:   Diagnosis Date   • Arthritis    • Asthma    • Lupus ([address] )     8 years   • No known problems        Past Surgical History:   Procedure Laterality Date   • NO PAST SURGERIES         History reviewed  No pertinent family history  Medications have been verified  Objective   /77   Pulse 95   Temp 98 1 °F (36 7 °C)   Resp 17   SpO2 98%        Physical Exam     Physical Exam  Constitutional:       General: She is not in acute distress  Appearance: She is not toxic-appearing  HENT:      Right Ear: Tympanic membrane is erythematous  Left Ear: Tympanic membrane is not erythematous  Mouth/Throat:      Comments: No OP erythema or edema  No tonsillar enlargement  Cardiovascular:      Rate and Rhythm: Normal rate  Heart sounds: Normal heart sounds  Pulmonary:      Effort: Pulmonary effort is normal       Breath sounds: Normal breath sounds  Skin:     General: Skin is warm and dry  Findings: No rash  Neurological:      Mental Status: She is alert     Psychiatric:         Mood and Affect: Mood normal          Behavior: Behavior normal  pleural effusion  S/p IR chest tube placement 3/23/21  Chest tube drainage reduced  For trial of tpa BID x 3days  Pulmonary on board  Oxygen supplementation to keep sats > 92%    Supratherapeutic INR  Assessment & Plan  Patient with INR of 8 77 on admission, possibly secondary to severe malnutrition, Albumin was 1 8 on admission  Patient's family denies history of heavy alcohol abuse (CIWA discontinued)  Liver enzymes within normal limits, no history of liver disease  Not on any anticoagulation  S/p vitamin  K 10mg once - with rapid improvement in INR to 1 27  factor VII activity results pending      Valvular heart disease  Assessment & Plan  Patietn with multiple valvular disease - Moderate AS, mild -mod MR, moderate TR  EF 93%, grade 1 diastolic dysfunction with pulmonary hypertension, 60mmhg    Anemia  Assessment & Plan  Hb 10 1 on admission - chronic normocytic normochromic anemia  Iron panel - Chronic anemia of inflammation  Hb downtrended to 7 2 today - no signs of overt bleeding seen  Keep hb > 7, transfuse if needed    Severe protein-calorie malnutrition (HCC)  Assessment & Plan  Malnutrition Findings:   Adult Malnutrition type: Acute illness  Adult Degree of Malnutrition: Other severe protein calorie malnutritionsevere protein calorie malnutrition as evidenced by hollowed orbitals, temple hollowing, clavicle protrusion, with prominent bone and low albumin levels 1 6> 1 8, and associated coagulapathy with high INR 8 on admission in the setting of right lower lobe pneumonia with complicated right parapneumonic effusion    BMI Findings: Body mass index is 25 12 kg/m²         Thrombocytosis ([address] )  Assessment & Plan  Most likely reactive, In the setting of coagulopathy/sepsis  Plt 624 today      Coagulopathy (HCC)  Assessment & Plan  Coagulopathy possibly due to ETOH abuse, malnutrition a/e/b INR 8 77, treated with vitamin K 10mg SQ, telemetry and monitoring for signs of bleeding/CBCs  INR improved to 1 2    Acute respiratory failure with hypoxia [facility])  Assessment & Plan  Acute hypoxic resp failure, POA, a/e/b sob, hypoxia 85% on 2L >8L, tachypnea 19-22 secondary to right lower lobe pneumonia, large right parapneumonic effusion and atelectasis  S/p IR chest tube placement 3/23/21  Oxygen requirements down to 6L NC  Wean oxygen as tolerated  Goal O2 sats > 92%    HTN (hypertension)  Assessment & Plan  Home meds - 20mg lasix daily  Monitor BP per unit protocol  Hydralazine p r n  GERD (gastroesophageal reflux disease)  Assessment & Plan  On Protonix    Hypokalemia  Assessment & Plan  Monitor and replace electrolytes as needed    CAD (coronary artery disease)  Assessment & Plan  Continue on Plavix and Lipitor        VTE Pharmacologic Prophylaxis:   Pharmacologic: Enoxaparin (Lovenox)  Mechanical VTE Prophylaxis in Place: Yes    Patient Centered Rounds: I have performed bedside rounds with nursing staff today  Discussions with Specialists or Other Care Team Provider: CM< pulm, ID on board    Education and Discussions with Family / Patient: I updated patient's daughter    Time Spent for Care: 30 minutes  More than 50% of total time spent on counseling and coordination of care as described above  Current Length of Stay: 4 day(s)    Current Patient Status: Inpatient   Certification Statement: The patient will continue to require additional inpatient hospital stay due to as above    Discharge Plan: 2-3 days    Code Status: Level 1 - Full Code      Subjective:   No overnight events, no complaints this am  Mentation improving slowly  Objective:     Vitals:   Temp (24hrs), Av 4 °F (36 9 °C), Min:97 7 °F (36 5 °C), Max:98 9 °F (37 2 °C)    Temp:  [97 7 °F (36 5 °C)-98 9 °F (37 2 °C)] 98 3 °F (36 8 °C)  HR:  [] 91  Resp:  [20-25] 22  BP: ()/(33-68) 127/60  SpO2:  [90 %-100 %] 90 %  Body mass index is 25 12 kg/m²  Input and Output Summary (last 24 hours):        Intake/Output Summary (Last 24 hours) at 3/26/2021 0935  Last data filed at 3/26/2021 0600  Gross per 24 hour   Intake 1690 ml   Output 1342 ml   Net 348 ml       Physical Exam:     Physical Exam  Vitals signs and nursing note reviewed  Constitutional:       General: She is not in acute distress  Appearance: Normal appearance  She is ill-appearing  She is not toxic-appearing or diaphoretic  Cardiovascular:      Rate and Rhythm: Normal rate and regular rhythm  Pulses: Normal pulses  Heart sounds: Normal heart sounds  Pulmonary:      Effort: Pulmonary effort is normal  No respiratory distress  Breath sounds: Normal breath sounds  No stridor  No wheezing, rhonchi or rales  Chest:      Chest wall: No tenderness  Abdominal:      General: Bowel sounds are normal  There is no distension  Palpations: Abdomen is soft  Tenderness: There is no abdominal tenderness  There is no right CVA tenderness, left CVA tenderness or guarding  Musculoskeletal: Normal range of motion  General: No swelling or deformity  Right lower leg: No edema  Left lower leg: No edema  Skin:     General: Skin is warm  Capillary Refill: Capillary refill takes less than 2 seconds  Coloration: Skin is pale  Skin is not jaundiced  Findings: Bruising (RUE) and rash present  Neurological:      General: No focal deficit present  Mental Status: She is alert  She is confused  Psychiatric:         Mood and Affect: Mood normal          Speech: Speech normal          Behavior: Behavior is cooperative  Additional Data:     Labs:    Results from last 7 days   Lab Units 03/26/21  0246  03/25/21  0435   WBC Thousand/uL 20 60*  --  25 40*   HEMOGLOBIN g/dL 7 2*   < > 6 9*   HEMATOCRIT % 22 6*  --  21 4*   PLATELETS Thousands/uL 624*  --  605*   BANDS PCT %  --   --  1   LYMPHO PCT %  --   --  6*   MONO PCT %  --   --  7   EOS PCT %  --   --  1    < > = values in this interval not displayed       Results from last 7 days   Lab Units 03/26/21  0303  03/22/21  0401   SODIUM mmol/L 138   < > 133*   POTASSIUM mmol/L 3 5   < > 3 9   CHLORIDE mmol/L 103   < > 97*   CO2 mmol/L 24   < > 23   CO2, I-STAT   --    < >  --    BUN mg/dL 11   < > 17   CREATININE mg/dL 0 75   < > 0 88   ANION GAP mmol/L 11   < > 13   CALCIUM mg/dL 7 9*   < > 8 5   ALBUMIN g/dL  --   --  1 6*   TOTAL BILIRUBIN mg/dL  --   --  0 40   ALK PHOS U/L  --   --  220*   ALT U/L  --   --  24   AST U/L  --   --  30   GLUCOSE RANDOM mg/dL 79   < > 118    < > = values in this interval not displayed  Results from last 7 days   Lab Units 03/26/21  0246   INR  1 30*     Results from last 7 days   Lab Units 03/26/21  0556 03/26/21  0014   POC GLUCOSE mg/dl 74 96         Results from last 7 days   Lab Units 03/23/21  0447 03/22/21  0104 03/21/21  2254   LACTIC ACID mmol/L  --  1 4  --    PROCALCITONIN ng/ml 1 12*  --  0 80*           * I Have Reviewed All Lab Data Listed Above  * Additional Pertinent Lab Tests Reviewed: All Labs Within Last 24 Hours Reviewed    Imaging:    Imaging Reports Reviewed Today Include:   Imaging Personally Reviewed by Myself Includes:      Recent Cultures (last 7 days):     Results from last 7 days   Lab Units 03/25/21  0757 03/23/21  1743 03/21/21  2254   BLOOD CULTURE   --   --  No Growth After 4 Days  No Growth After 4 Days     GRAM STAIN RESULT   --  1+ Polys*  Rare Gram positive cocci in pairs*  2+ Polys  No No bacteria seen  --    BODY FLUID CULTURE, STERILE   --  No growth  --    LEGIONELLA URINARY ANTIGEN  Negative  --   --        Last 24 Hours Medication List:   Current Facility-Administered Medications   Medication Dose Route Frequency Provider Last Rate    acetaminophen  975 mg Oral Q6H PRN Reynmaria t Sprain, CRNP      ALPRAZolam  0 25 mg Oral Q6H PRN Allan Mixon MD      dornase sheryl (PULMOZYME) 5 mg in 30 mL SWFI chest tube/drain tube instillation  5 mg Chest Tube BID Anna De Anda PA-C      And    alteplase (TPA) 10 mg in SWFI 30 mL chest tube/drain tube instillation  10 mg Chest Tube BID Mercy Gobble, PA-C      atorvastatin  40 mg Oral Daily With Fabian Humphrey PA-C      cefepime  2,000 mg Intravenous Q8H Mercy Gobble, PA-C 2,000 mg (03/26/21 0933)    clopidogrel  75 mg Oral Daily Mercy Gobble, PA-C      dextromethorphan-guaiFENesin  10 mL Oral Q4H PRN Mercy Gobble, PA-C      enoxaparin  40 mg Subcutaneous Daily Neil Aguilar MD      folic acid IVPB  1 mg Intravenous Daily PoppyRONALDO BloomNP Stopped (03/25/21 1215)    furosemide  20 mg Oral Daily Mercy Gobble, PA-C      hydrALAZINE  10 mg Intravenous Q6H PRN Mercy Gobble, PA-C      ipratropium-albuterol  3 mL Nebulization Q6H PRN Mercy Gobble, PA-C      ipratropium-albuterol  3 mL Nebulization TID Mercy Gobble, PA-C      lidocaine  1 patch Topical Daily NICKOLAS Hwang      metroNIDAZOLE  500 mg Intravenous Q8H Mercy Gobble, PA-C Stopped (03/26/21 0440)   [facility] multivitamin-minerals  1 tablet Oral Daily Mercy Gobble, PA-C      ondansetron  4 mg Intravenous Q6H PRN Mercy Gobble, PA-C      pantoprazole  20 mg Oral Early Morning Mercy Gobble, PA-C      thiamine  100 mg Intravenous Daily NICKOLAS Darnell Stopped (03/25/21 1243)    vancomycin  15 mg/kg Intravenous Q12H Poppy Washington, RONALDONP Stopped (03/26/21 0008)        Today, Patient Was Seen By: Neil Aguilar MD    ** Please Note: Dictation voice to text software may have been used in the creation of this document   **

## 2023-10-19 ENCOUNTER — OFFICE VISIT (OUTPATIENT)
Dept: OBGYN CLINIC | Facility: CLINIC | Age: 28
End: 2023-10-19

## 2023-10-19 VITALS
BODY MASS INDEX: 24.68 KG/M2 | HEIGHT: 59 IN | WEIGHT: 122.4 LBS | DIASTOLIC BLOOD PRESSURE: 82 MMHG | HEART RATE: 93 BPM | SYSTOLIC BLOOD PRESSURE: 132 MMHG

## 2023-10-19 DIAGNOSIS — Z12.39 ENCOUNTER FOR BREAST CANCER SCREENING USING NON-MAMMOGRAM MODALITY: ICD-10-CM

## 2023-10-19 DIAGNOSIS — Z12.4 CERVICAL CANCER SCREENING: ICD-10-CM

## 2023-10-19 DIAGNOSIS — Z01.419 WOMEN'S ANNUAL ROUTINE GYNECOLOGICAL EXAMINATION: Primary | ICD-10-CM

## 2023-10-19 DIAGNOSIS — Z30.41 SURVEILLANCE FOR BIRTH CONTROL, ORAL CONTRACEPTIVES: ICD-10-CM

## 2023-10-19 PROBLEM — N80.9 ENDOMETRIOSIS: Status: ACTIVE | Noted: 2023-10-19

## 2023-10-19 PROCEDURE — 88175 CYTOPATH C/V AUTO FLUID REDO: CPT | Performed by: NURSE PRACTITIONER

## 2023-10-19 RX ORDER — NORGESTIMATE AND ETHINYL ESTRADIOL 0.25-0.035
1 KIT ORAL DAILY
Qty: 84 TABLET | Refills: 3 | Status: SHIPPED | OUTPATIENT
Start: 2023-10-19

## 2023-10-19 NOTE — PROGRESS NOTES
Catherine Nielsen is a 29 y.o. female who presents today for annual GYN exam. She is a new patient to our office, transferring care from [facility]. Her last pap smear was performed  and result was NILM. She reports no history of abnormal pap smears in her past. She received the HPV vaccine in the past. She reports a history of endometriosis which has been managed by OCP for many years. She reports that she changed her OCPs in January and since August has been having almost daily breakthrough bleeding. She has a questionable history of Lupus, per documentation from [facility] she reported "pre-lupus". Per [facility] results, she has negative OLAF, negative lupus anticoagulant and a normal thrombotic risk/acquired antiphospholipid panel. Her general medical history has been reviewed and she reports it as follows:    Past Medical History:   Diagnosis Date    Arthritis     Asthma     Endometriosis 10/19/2023    Lupus ([address])     8 years    No known problems      Past Surgical History:   Procedure Laterality Date     SECTION       SECTION      NO PAST SURGERIES       OB History          2    Para   2    Term   2            AB        Living   2         SAB        IAB        Ectopic        Multiple        Live Births                   Social History     Tobacco Use    Smoking status: Never    Smokeless tobacco: Never   Substance Use Topics    Alcohol use: No    Drug use: No     Social History     Substance and Sexual Activity   Sexual Activity Yes    Partners: Male    Birth control/protection: OCP     Cancer-related family history is negative for Breast cancer and Colon cancer.     Current Outpatient Medications   Medication Instructions    albuterol (PROVENTIL HFA,VENTOLIN HFA) 90 mcg/act inhaler 2 puffs, Inhalation, Every 4 hours PRN    fluticasone (FLONASE) 50 mcg/act nasal spray 1 spray, Nasal, Daily    neomycin-polymyxin-hydrocortisone (CORTISPORIN) 0.35%-10,000 units/mL-1% otic suspension 4 drops, Otic, 3 times daily    norgestimate-ethinyl estradiol (Sprintec 28) 0.25-35 MG-MCG per tablet 1 tablet, Oral, Daily, Take continuously, skipping placebo pills. Review of Systems:  Review of Systems   Constitutional: Negative. Gastrointestinal: Negative. Genitourinary: Negative. Skin: Negative. Physical Exam:  /82 (BP Location: Right arm)   Pulse 93   Ht 4' 11" (1.499 m)   Wt 55.5 kg (122 lb 6.4 oz)   BMI 24.72 kg/m²   Physical Exam  Constitutional:       General: She is not in acute distress. Appearance: Normal appearance. Genitourinary:      Vulva and bladder normal.      No lesions in the vagina. No vaginal erythema or ulceration. Right Adnexa: not tender and no mass present. Left Adnexa: not tender and no mass present. No cervical motion tenderness or lesion. Uterus is not enlarged or tender. No uterine mass detected. Breasts:     Right: No mass, nipple discharge or skin change. Left: No mass, nipple discharge or skin change. Cardiovascular:      Rate and Rhythm: Normal rate and regular rhythm. Pulmonary:      Effort: Pulmonary effort is normal.      Breath sounds: Normal breath sounds. Abdominal:      General: Abdomen is flat. Palpations: Abdomen is soft. Musculoskeletal:      Cervical back: Neck supple. Neurological:      Mental Status: She is alert. Skin:     General: Skin is warm and dry. Psychiatric:         Mood and Affect: Mood normal.         Behavior: Behavior normal.   Vitals reviewed. Assessment/Plan:   1. Normal well-woman GYN exam.  2. Cervical cancer screening:  Normal cervical exam.  Pap smear done. Has received HPV vaccine in the past.   3. STD screening:  Patient declines. 4. Breast cancer screening:  Normal breast exam. Reviewed breast self-awareness. 5. Depression Screening: Patient's depression screening was assessed with a PHQ-2 score of 2. Their PHQ-9 score was 2. Clinically patient does not have depression. No treatment is required. 6. BMI Counseling: Body mass index is 24.72 kg/m². Discussed the patient's BMI with her. The BMI is normal    7. Contraception:  Will change OCPs due to breakthrough bleeding. Patient has questionable history of Lupus, though has had normal thrombotic risk/acquired antiphospholipid panel, making [facility] Category 2 and safe to continue. 8. Return to office in one year for annual exam or sooner if needed. Reviewed with patient that [facility], but that they will not necessarily be reviewed by me immediately. Explained that I will review results at my earliest opportunity and contact patient appropriately.

## 2023-10-24 LAB
LAB AP GYN PRIMARY INTERPRETATION: NORMAL
Lab: NORMAL

## 2023-10-25 ENCOUNTER — TELEPHONE (OUTPATIENT)
Dept: OBGYN CLINIC | Facility: CLINIC | Age: 28
End: 2023-10-25

## 2023-10-25 NOTE — TELEPHONE ENCOUNTER
----- Message from Leeanne Cameron, [address] [place] [address] sent at 10/25/2023  8:48 AM EDT -----  Please let her know the pap is normal. Thank you!

## 2023-11-08 ENCOUNTER — OFFICE VISIT (OUTPATIENT)
Dept: URGENT CARE | Facility: CLINIC | Age: 28
End: 2023-11-08
Payer: COMMERCIAL

## 2023-11-08 VITALS
RESPIRATION RATE: 18 BRPM | SYSTOLIC BLOOD PRESSURE: 155 MMHG | HEART RATE: 96 BPM | DIASTOLIC BLOOD PRESSURE: 88 MMHG | TEMPERATURE: 97.3 F | OXYGEN SATURATION: 99 %

## 2023-11-08 DIAGNOSIS — J06.9 VIRAL URI WITH COUGH: Primary | ICD-10-CM

## 2023-11-08 PROCEDURE — 99213 OFFICE O/P EST LOW 20 MIN: CPT | Performed by: PHYSICIAN ASSISTANT

## 2023-11-08 RX ORDER — METHYLPREDNISOLONE 4 MG/1
TABLET ORAL
Qty: 21 EACH | Refills: 0 | Status: SHIPPED | OUTPATIENT
Start: 2023-11-08

## 2023-11-08 RX ORDER — UREA 10 %
500 LOTION (ML) TOPICAL DAILY
COMMUNITY

## 2023-11-08 NOTE — PROGRESS NOTES
[facility]        NAME: Margy Musa is a 29 y.o. female  : 1995    MRN: [id number]  DATE: 2023  TIME: 4:39 PM    Assessment and Plan   Viral URI with cough [J06.9]  1. Viral URI with cough  methylPREDNISolone 4 MG tablet therapy pack        Lungs CTA. SPO2 99%. Short course of Prednisone given hx of lupus     Patient Instructions       Follow up with PCP in 3-5 days. Proceed to  ER if symptoms worsen. Chief Complaint     Chief Complaint   Patient presents with   • Cough     Negative covid test this morning. Has lupas, which attacks her lungs, wants to get checked out         History of Present Illness       Patient is a 28 yo female who presents for evaluation of cough, congestion, sore throat x 1 day. Feels some chest tightness. States she has lupus and is concerned about her lungs. Negative home Covid test this AM.  Both kids have URIs. No fevers, SOB, CP. Review of Systems   Review of Systems   Constitutional:  Negative for fever. HENT:  Positive for congestion and sore throat. Respiratory:  Positive for cough and chest tightness. Negative for shortness of breath. Cardiovascular:  Negative for chest pain. Current Medications       Current Outpatient Medications:   •  albuterol (PROVENTIL HFA,VENTOLIN HFA) 90 mcg/act inhaler, Inhale 2 puffs every 4 (four) hours as needed for wheezing., Disp: 1 Inhaler, Rfl: 0  •  methylPREDNISolone 4 MG tablet therapy pack, Use as directed on package, Disp: 21 each, Rfl: 0  •  norgestimate-ethinyl estradiol (Sprintec 28) 0.25-35 MG-MCG per tablet, Take 1 tablet by mouth daily Take continuously, skipping placebo pills. , Disp: 84 tablet, Rfl: 3  •  calcium carbonate (OS-MAHENDRA) 1250 (500 Ca) MG chewable tablet, Chew 500 mg daily (Patient not taking: Reported on 2023), Disp: , Rfl:     Current Allergies     Allergies as of 2023 - Reviewed 2023   Allergen Reaction Noted   • Amoxicillin-pot clavulanate GI Intolerance 2022   • Benzocaine Swelling 04/15/2020   • Erythromycin  2016   • Erythromycin base Nausea Only 2015   • Latex Hives 2022   • Lidocaine  2016   • Other  2015            The following portions of the patient's history were reviewed and updated as appropriate: allergies, current medications, past family history, past medical history, past social history, past surgical history and problem list.     Past Medical History:   Diagnosis Date   • Arthritis    • Asthma    • Endometriosis 10/19/2023   • Lupus ([address])     8 years   • No known problems        Past Surgical History:   Procedure Laterality Date   •  SECTION     •  SECTION     • NO PAST SURGERIES         Family History   Problem Relation Age of Onset   • Breast cancer Neg Hx    • Colon cancer Neg Hx          Medications have been verified. Objective   /88   Pulse 96   Temp (!) 97.3 °F (36.3 °C)   Resp 18   SpO2 99%        Physical Exam     Physical Exam  Constitutional:       General: She is not in acute distress. Appearance: Normal appearance. She is not ill-appearing or diaphoretic. HENT:      Right Ear: Tympanic membrane, ear canal and external ear normal.      Left Ear: Tympanic membrane, ear canal and external ear normal.      Nose: Congestion present. Mouth/Throat:      Mouth: Mucous membranes are moist.      Pharynx: Oropharynx is clear. Posterior oropharyngeal erythema present. Comments: OP mildly hyperemic   Eyes:      Conjunctiva/sclera: Conjunctivae normal.   Cardiovascular:      Rate and Rhythm: Normal rate and regular rhythm. Heart sounds: Normal heart sounds. Pulmonary:      Effort: Pulmonary effort is normal.      Breath sounds: Normal breath sounds. Skin:     General: Skin is warm and dry. Neurological:      Mental Status: She is alert.    Psychiatric:         Mood and Affect: Mood normal.         Behavior: Behavior normal.

## 2023-11-08 NOTE — PATIENT INSTRUCTIONS
Your lungs were clear. Symptoms are consistent with a viral upper respiratory tract infection.  Prednisone has been prescribed to help respiratory tract inflammation

## 2023-11-14 ENCOUNTER — OFFICE VISIT (OUTPATIENT)
Dept: URGENT CARE | Age: 28
End: 2023-11-14
Payer: COMMERCIAL

## 2023-11-14 VITALS
TEMPERATURE: 98 F | SYSTOLIC BLOOD PRESSURE: 132 MMHG | DIASTOLIC BLOOD PRESSURE: 86 MMHG | RESPIRATION RATE: 20 BRPM | OXYGEN SATURATION: 98 % | HEART RATE: 106 BPM

## 2023-11-14 DIAGNOSIS — H92.02 LEFT EAR PAIN: Primary | ICD-10-CM

## 2023-11-14 PROCEDURE — 99213 OFFICE O/P EST LOW 20 MIN: CPT

## 2023-11-14 NOTE — PATIENT INSTRUCTIONS
Alternate ibuprofen and Tylenol as needed for pain. Ensure clear fluid intake. Recommend trialing Claritin or Zyrtec as well as Flonase daily. Follow up with PCP in 3-5 days. Proceed to  ER if symptoms worsen. Pt states she does not want the xray and would like to be discharged. SERGEY Pena notified

## 2023-11-14 NOTE — PROGRESS NOTES
[facility]        NAME: Stephany Pierre is a 29 y.o. female  : 1995    MRN: [id number]  DATE: 2023  TIME: 4:14 PM    Assessment and Plan   Left ear pain [H92.02]  1. Left ear pain              Patient Instructions     Alternate ibuprofen and Tylenol as needed for pain. Ensure clear fluid intake. Recommend trialing Claritin or Zyrtec as well as Flonase daily. Follow up with PCP in 3-5 days. Proceed to  ER if symptoms worsen. Chief Complaint     Chief Complaint   Patient presents with    Earache    Cough     Sinus infection, cough, running nose and bilateral ear pain for 1 week. Patient states she was on prednisone and finished yesterday. History of Present Illness       71-year-old female presenting for evaluation of upper respiratory symptoms. Patient states that she was recently ill with a chest cold and has been experiencing right-sided ear pain. She states that she has been having intermittent cough and sore throat, and recently completed steroid therapy for her chest cold. She denies any measured fevers, chills, chest pain or shortness of breath. She denies any bleeding or drainage from her ear. Earache   Associated symptoms include coughing and a sore throat. Cough  Associated symptoms include ear pain and a sore throat. Pertinent negatives include no chest pain, chills, fever or shortness of breath. Review of Systems   Review of Systems   Constitutional:  Negative for chills and fever. HENT:  Positive for ear pain and sore throat. Respiratory:  Positive for cough. Negative for shortness of breath. Cardiovascular:  Negative for chest pain. All other systems reviewed and are negative.         Current Medications       Current Outpatient Medications:     albuterol (PROVENTIL HFA,VENTOLIN HFA) 90 mcg/act inhaler, Inhale 2 puffs every 4 (four) hours as needed for wheezing., Disp: 1 Inhaler, Rfl: 0    calcium carbonate (OS-MAHENDRA) 1250 (500 Ca) MG chewable tablet, Chew 500 mg daily (Patient not taking: Reported on 2023), Disp: , Rfl:     methylPREDNISolone 4 MG tablet therapy pack, Use as directed on package, Disp: 21 each, Rfl: 0    norgestimate-ethinyl estradiol (Sprintec 28) 0.25-35 MG-MCG per tablet, Take 1 tablet by mouth daily Take continuously, skipping placebo pills. , Disp: 84 tablet, Rfl: 3    Current Allergies     Allergies as of 2023 - Reviewed 2023   Allergen Reaction Noted    Amoxicillin-pot clavulanate GI Intolerance 2022    Benzocaine Swelling 04/15/2020    Erythromycin  2016    Erythromycin base Nausea Only 2015    Latex Hives 2022    Lidocaine  2016    Other  2015            The following portions of the patient's history were reviewed and updated as appropriate: allergies, current medications, past family history, past medical history, past social history, past surgical history and problem list.     Past Medical History:   Diagnosis Date    Arthritis     Asthma     Endometriosis 10/19/2023    Lupus ([address])     8 years    No known problems        Past Surgical History:   Procedure Laterality Date     SECTION       SECTION      NO PAST SURGERIES         Family History   Problem Relation Age of Onset    Breast cancer Neg Hx     Colon cancer Neg Hx          Medications have been verified. Objective   /86   Pulse (!) 106   Temp 98 °F (36.7 °C) (Temporal)   Resp 20   SpO2 98%        Physical Exam     Physical Exam  Vitals and nursing note reviewed. Constitutional:       General: She is not in acute distress. Appearance: Normal appearance. She is normal weight. She is not ill-appearing, toxic-appearing or diaphoretic. HENT:      Head: Normocephalic and atraumatic. Right Ear: Tympanic membrane normal.      Left Ear: Tympanic membrane is erythematous. Nose: Nose normal. No congestion or rhinorrhea.       Mouth/Throat:      Mouth: Mucous membranes are moist.      Pharynx: Oropharynx is clear. No oropharyngeal exudate or posterior oropharyngeal erythema. Eyes:      General:         Right eye: No discharge. Left eye: No discharge. Cardiovascular:      Rate and Rhythm: Normal rate and regular rhythm. Pulses: Normal pulses. Heart sounds: Normal heart sounds. No murmur heard. No friction rub. No gallop. Pulmonary:      Effort: Pulmonary effort is normal. No respiratory distress. Breath sounds: Normal breath sounds. No stridor. No wheezing, rhonchi or rales. Chest:      Chest wall: No tenderness. Abdominal:      General: Bowel sounds are normal.      Palpations: Abdomen is soft. Tenderness: There is no abdominal tenderness. Skin:     General: Skin is warm and dry. Neurological:      Mental Status: She is alert.    Psychiatric:         Mood and Affect: Mood normal.         Behavior: Behavior normal.

## 2023-11-17 ENCOUNTER — HOSPITAL ENCOUNTER (EMERGENCY)
Facility: HOSPITAL | Age: 28
Discharge: HOME | End: 2023-11-17
Attending: EMERGENCY MEDICINE
Payer: COMMERCIAL

## 2023-11-17 ENCOUNTER — APPOINTMENT (EMERGENCY)
Dept: RADIOLOGY | Facility: HOSPITAL | Age: 28
End: 2023-11-17
Payer: COMMERCIAL

## 2023-11-17 VITALS
HEIGHT: 59 IN | RESPIRATION RATE: 20 BRPM | BODY MASS INDEX: 25.2 KG/M2 | OXYGEN SATURATION: 97 % | WEIGHT: 125 LBS | SYSTOLIC BLOOD PRESSURE: 117 MMHG | HEART RATE: 102 BPM | DIASTOLIC BLOOD PRESSURE: 65 MMHG | TEMPERATURE: 98.9 F

## 2023-11-17 DIAGNOSIS — J45.901 ASTHMA EXACERBATION: Primary | ICD-10-CM

## 2023-11-17 DIAGNOSIS — R05.9 COUGH: ICD-10-CM

## 2023-11-17 DIAGNOSIS — J06.9 VIRAL URI: ICD-10-CM

## 2023-11-17 PROCEDURE — 94640 AIRWAY INHALATION TREATMENT: CPT

## 2023-11-17 PROCEDURE — 71046 X-RAY EXAM CHEST 2 VIEWS: CPT

## 2023-11-17 PROCEDURE — 99284 EMERGENCY DEPT VISIT MOD MDM: CPT | Performed by: EMERGENCY MEDICINE

## 2023-11-17 PROCEDURE — 99283 EMERGENCY DEPT VISIT LOW MDM: CPT

## 2023-11-17 RX ORDER — FLUTICASONE PROPIONATE 50 MCG
1 SPRAY, SUSPENSION (ML) NASAL 2 TIMES DAILY PRN
Qty: 16 G | Refills: 0 | Status: SHIPPED | OUTPATIENT
Start: 2023-11-17

## 2023-11-17 RX ORDER — ALBUTEROL SULFATE 2.5 MG/3ML
2.5 SOLUTION RESPIRATORY (INHALATION) ONCE
Status: COMPLETED | OUTPATIENT
Start: 2023-11-17 | End: 2023-11-17

## 2023-11-17 RX ORDER — ALBUTEROL SULFATE 90 UG/1
2 AEROSOL, METERED RESPIRATORY (INHALATION) EVERY 4 HOURS PRN
Qty: 6.7 G | Refills: 0 | Status: SHIPPED | OUTPATIENT
Start: 2023-11-17

## 2023-11-17 RX ADMIN — IPRATROPIUM BROMIDE 0.5 MG: 0.5 SOLUTION RESPIRATORY (INHALATION) at 21:39

## 2023-11-17 RX ADMIN — ALBUTEROL SULFATE 2.5 MG: 2.5 SOLUTION RESPIRATORY (INHALATION) at 21:39

## 2023-11-18 NOTE — DISCHARGE INSTRUCTIONS
Use Flonase to help with your nasal congestion. Use saline or salt water nasal spray, and a humidifier, especially at night. Continue to use over-the-counter cough and cold medications to help with your symptoms. Use the albuterol as needed. Return if you develop trouble breathing that does not improve with albuterol, or for any other concerns.

## 2023-11-18 NOTE — ED PROVIDER NOTES
History  Chief Complaint   Patient presents with    Flu Symptoms     Patient co cough, congestion that started 1 week ago. Was seen at urgent care and dx with pneumonia and given zpak. Patient reports minimal relief of symptoms. Flu Symptoms      Prior to Admission Medications   Prescriptions Last Dose Informant Patient Reported? Taking? albuterol (PROVENTIL HFA,VENTOLIN HFA) 90 mcg/act inhaler   No No   Sig: Inhale 2 puffs every 4 (four) hours as needed for wheezing. calcium carbonate (OS-MAHENDRA) 1250 (500 Ca) MG chewable tablet   Yes No   Sig: Chew 500 mg daily   Patient not taking: Reported on 2023   methylPREDNISolone 4 MG tablet therapy pack   No No   Sig: Use as directed on package   norgestimate-ethinyl estradiol (Sprintec 28) 0.25-35 MG-MCG per tablet   No No   Sig: Take 1 tablet by mouth daily Take continuously, skipping placebo pills. Facility-Administered Medications: None       Past Medical History:   Diagnosis Date    Arthritis     Asthma     Endometriosis 10/19/2023    Lupus ([address])     8 years    No known problems        Past Surgical History:   Procedure Laterality Date     SECTION       SECTION      NO PAST SURGERIES         Family History   Problem Relation Age of Onset    Breast cancer Neg Hx     Colon cancer Neg Hx      I have reviewed and agree with the history as documented.     E-Cigarette/Vaping    E-Cigarette Use Never User      E-Cigarette/Vaping Substances     Social History     Tobacco Use    Smoking status: Never    Smokeless tobacco: Never   Vaping Use    Vaping Use: Never used   Substance Use Topics    Alcohol use: No    Drug use: No       Review of Systems    Physical Exam  Physical Exam    Vital Signs  ED Triage Vitals [23]   Temperature Pulse Respirations Blood Pressure SpO2   98.9 °F (37.2 °C) 102 20 117/65 97 %      Temp Source Heart Rate Source Patient Position - Orthostatic VS BP Location FiO2 (%)   Temporal Monitor Sitting Left arm --      Pain Score       --           Vitals:    11/17/23 2123   BP: 117/65   Pulse: 102   Patient Position - Orthostatic VS: Sitting         Visual Acuity      ED Medications  Medications   albuterol inhalation solution 2.5 mg (2.5 mg Nebulization Given 11/17/23 2139)   ipratropium (ATROVENT) 0.02 % inhalation solution 0.5 mg (0.5 mg Nebulization Given 11/17/23 2139)       Diagnostic Studies  Results Reviewed       None                   XR chest 2 views    (Results Pending)              Procedures  Procedures         ED Course                               SBIRT 22yo+      Flowsheet Row Most Recent Value   Initial Alcohol Screen: US AUDIT-C     1. How often do you have a drink containing alcohol? 0 Filed at: 11/17/2023 2136   2. How many drinks containing alcohol do you have on a typical day you are drinking? 0 Filed at: 11/17/2023 2136   3b. FEMALE Any Age, or MALE 65+: How often do you have 4 or more drinks on one occassion? 0 Filed at: 11/17/2023 2136   Audit-C Score 0 Filed at: 11/17/2023 2136   ARIADNA: How many times in the past year have you. .. Used an illegal drug or used a prescription medication for non-medical reasons? Never Filed at: 11/17/2023 2136                      Medical Decision Making  This is a 69-year-old female who presents for about a week with persistent cough. She says about a week ago she started having URI symptoms with nasal congestion and cough. She is having some "cough aches" in her upper back from frequent coughing and feels like her voice is getting hoarse as a result of this. She had a temperature of 100.4 initially but only up to 99 since then. She denies any nausea, vomiting, diarrhea, other myalgias or arthralgias. She does have a history of asthma and only takes albuterol as needed. She used it once yesterday but nothing else for her asthma. She denies shortness of breath. She denies known sick contacts.   She endorses a history of lupus but does not take anything for this. She has been taking Delsym with minimal relief. She said she was seen at urgent care and prescribed a steroid pack which helped mildly while taking it. However, symptoms have persisted. She was seen 11/8 at urgent care, diagnosed with viral URI. Exam says the lungs were clear and she was prescribed Medrol Dosepak. She was seen 11/14 at urgent care for left ear pain and had erythematous tympanic membrane but no signs of otitis media. Review of systems: Otherwise negative unless stated as above    She is well-appearing, no acute distress. She has nasal congestion. She has an intermittent cough which is occasionally associated with deep breaths, not paroxysmal.  She has no respiratory distress. Exam is otherwise unremarkable. Given duration of symptoms, we will check a chest x-ray to evaluate for secondary pneumonia. However, this is more likely viral illness with persistent cough due to reactive airway disease. We will treat her with nebulizers for symptom management. Chest x-ray was reviewed myself, and shows no acute abnormality. She notes mild improvement of cough. Exam is improved with repeat examination. I discussed with her findings, continued symptomatic management at home, follow-up for further evaluation, and indications for return, she expresses understanding with this plan. Problems Addressed:  Asthma exacerbation: acute illness or injury  Cough: acute illness or injury  Viral URI: acute illness or injury    Amount and/or Complexity of Data Reviewed  External Data Reviewed: notes. Radiology: ordered and independent interpretation performed. Decision-making details documented in ED Course. Risk  Prescription drug management.              Disposition  Final diagnoses:   Asthma exacerbation   Viral URI   Cough     Time reflects when diagnosis was documented in both MDM as applicable and the Disposition within this note       Time User Action Codes Description Comment    11/17/2023 10:21 PM Umair Andrade Add [J45.901] Asthma exacerbation     11/17/2023 10:21 PM Umair Andrade Add [J06.9] Viral URI     11/17/2023 10:21 PM Umair Andrade Add [R05.9] Cough           ED Disposition       ED Disposition   Discharge    Condition   Good    Date/Time   Fri Nov 17, 2023 [facility] discharge to home/self care. Follow-up Information       Follow up With Specialties Details Why Contact Info Additional Information    your pcp  Schedule an appointment as soon as possible for a visit  to follow up on your symptoms      [facility] [address] Schedule an appointment as soon as possible for a visit  to set up care with a new primary care doctor [address] [place] [address]  [address] [place] [address] [address] [facility] Primary Care Family Medicine Schedule an appointment as soon as possible for a visit  to set up care with a new primary care doctor [address] [place] [address] [address] [facility], [facility] [address] [place] [address], [id number], [phone]            Discharge Medication List as of 11/17/2023 10:25 PM        START taking these medications    Details   !! albuterol (PROVENTIL HFA,VENTOLIN HFA) 90 mcg/act inhaler Inhale 2 puffs every 4 (four) hours as needed for wheezing or shortness of breath, Starting Fri 11/17/2023, Print      fluticasone (FLONASE) 50 mcg/act nasal spray 1 spray into each nostril 2 (two) times a day as needed for rhinitis, Starting Fri 11/17/2023, Print       !! - Potential duplicate medications found. Please discuss with provider.         CONTINUE these medications which have NOT CHANGED    Details   !! albuterol (PROVENTIL HFA,VENTOLIN HFA) 90 mcg/act inhaler Inhale 2 puffs every 4 (four) hours as needed for wheezing., Starting 9/16/2016, Until Discontinued, Print      calcium carbonate (OS-MAHENDRA) 1250 (500 Ca) MG chewable tablet Chew 500 mg daily, Historical Med      methylPREDNISolone 4 MG tablet therapy pack Use as directed on package, Normal      norgestimate-ethinyl estradiol (Sprintec 28) 0.25-35 MG-MCG per tablet Take 1 tablet by mouth daily Take continuously, skipping placebo pills. , Starting Thu 10/19/2023, Normal       !! - Potential duplicate medications found. Please discuss with provider. No discharge procedures on file.     PDMP Review       None            ED Provider  Electronically Signed by             Paulie Mora MD  11/18/23 2015

## 2024-01-17 ENCOUNTER — HOSPITAL ENCOUNTER (EMERGENCY)
Facility: HOSPITAL | Age: 29
Discharge: HOME | End: 2024-01-17
Attending: EMERGENCY MEDICINE
Payer: COMMERCIAL

## 2024-01-17 VITALS
TEMPERATURE: 101.3 F | OXYGEN SATURATION: 96 % | DIASTOLIC BLOOD PRESSURE: 57 MMHG | RESPIRATION RATE: 18 BRPM | HEART RATE: 74 BPM | SYSTOLIC BLOOD PRESSURE: 115 MMHG

## 2024-01-17 DIAGNOSIS — J11.1 INFLUENZA: Primary | ICD-10-CM

## 2024-01-17 LAB
ALBUMIN SERPL BCP-MCNC: 3.9 G/DL (ref 3.5–5)
ALP SERPL-CCNC: 42 U/L (ref 34–104)
ALT SERPL W P-5'-P-CCNC: 10 U/L (ref 7–52)
ANION GAP SERPL CALCULATED.3IONS-SCNC: 10 MMOL/L
AST SERPL W P-5'-P-CCNC: 16 U/L (ref 13–39)
BACTERIA UR QL AUTO: ABNORMAL /HPF
BASOPHILS # BLD AUTO: 0 THOUSANDS/ÂΜL (ref 0–0.1)
BASOPHILS NFR BLD AUTO: 0 % (ref 0–1)
BILIRUB SERPL-MCNC: 0.44 MG/DL (ref 0.2–1)
BILIRUB UR QL STRIP: NEGATIVE
BUN SERPL-MCNC: 8 MG/DL (ref 5–25)
CALCIUM SERPL-MCNC: 8.9 MG/DL (ref 8.4–10.2)
CHLORIDE SERPL-SCNC: 100 MMOL/L (ref 96–108)
CLARITY UR: CLEAR
CO2 SERPL-SCNC: 24 MMOL/L (ref 21–32)
COLOR UR: YELLOW
CREAT SERPL-MCNC: 0.75 MG/DL (ref 0.6–1.3)
EOSINOPHIL # BLD AUTO: 0.02 THOUSAND/ÂΜL (ref 0–0.61)
EOSINOPHIL NFR BLD AUTO: 0 % (ref 0–6)
ERYTHROCYTE [DISTWIDTH] IN BLOOD BY AUTOMATED COUNT: 12.1 % (ref 11.6–15.1)
EXT PREGNANCY TEST URINE: NEGATIVE
EXT. CONTROL: NORMAL
FLUAV RNA RESP QL NAA+PROBE: POSITIVE
FLUBV RNA RESP QL NAA+PROBE: NEGATIVE
GFR SERPL CREATININE-BSD FRML MDRD: 108 ML/MIN/1.73SQ M
GLUCOSE SERPL-MCNC: 105 MG/DL (ref 65–140)
GLUCOSE UR STRIP-MCNC: NEGATIVE MG/DL
HCT VFR BLD AUTO: 37.9 % (ref 34.8–46.1)
HGB BLD-MCNC: 13.2 G/DL (ref 11.5–15.4)
HGB UR QL STRIP.AUTO: ABNORMAL
IMM GRANULOCYTES # BLD AUTO: 0.02 THOUSAND/UL (ref 0–0.2)
IMM GRANULOCYTES NFR BLD AUTO: 0 % (ref 0–2)
KETONES UR STRIP-MCNC: ABNORMAL MG/DL
LEUKOCYTE ESTERASE UR QL STRIP: NEGATIVE
LIPASE SERPL-CCNC: 8 U/L (ref 11–82)
LYMPHOCYTES # BLD AUTO: 0.24 THOUSANDS/ÂΜL (ref 0.6–4.47)
LYMPHOCYTES NFR BLD AUTO: 5 % (ref 14–44)
MCH RBC QN AUTO: 32.2 PG (ref 26.8–34.3)
MCHC RBC AUTO-ENTMCNC: 34.8 G/DL (ref 31.4–37.4)
MCV RBC AUTO: 92 FL (ref 82–98)
MONOCYTES # BLD AUTO: 0.37 THOUSAND/ÂΜL (ref 0.17–1.22)
MONOCYTES NFR BLD AUTO: 7 % (ref 4–12)
MUCOUS THREADS UR QL AUTO: ABNORMAL
NEUTROPHILS # BLD AUTO: 4.52 THOUSANDS/ÂΜL (ref 1.85–7.62)
NEUTS SEG NFR BLD AUTO: 88 % (ref 43–75)
NITRITE UR QL STRIP: NEGATIVE
NON-SQ EPI CELLS URNS QL MICRO: ABNORMAL /HPF
NRBC BLD AUTO-RTO: 0 /100 WBCS
PH UR STRIP.AUTO: 5.5 [PH]
PLATELET # BLD AUTO: 277 THOUSANDS/UL (ref 149–390)
PMV BLD AUTO: 8.8 FL (ref 8.9–12.7)
POTASSIUM SERPL-SCNC: 3.2 MMOL/L (ref 3.5–5.3)
PROT SERPL-MCNC: 7.1 G/DL (ref 6.4–8.4)
PROT UR STRIP-MCNC: ABNORMAL MG/DL
RBC # BLD AUTO: 4.1 MILLION/UL (ref 3.81–5.12)
RBC #/AREA URNS AUTO: ABNORMAL /HPF
RSV RNA RESP QL NAA+PROBE: NEGATIVE
SARS-COV-2 RNA RESP QL NAA+PROBE: NEGATIVE
SODIUM SERPL-SCNC: 134 MMOL/L (ref 135–147)
SP GR UR STRIP.AUTO: 1.03 (ref 1–1.03)
UROBILINOGEN UR STRIP-ACNC: <2 MG/DL
WBC # BLD AUTO: 5.17 THOUSAND/UL (ref 4.31–10.16)
WBC #/AREA URNS AUTO: ABNORMAL /HPF

## 2024-01-17 PROCEDURE — 96374 THER/PROPH/DIAG INJ IV PUSH: CPT

## 2024-01-17 PROCEDURE — 96375 TX/PRO/DX INJ NEW DRUG ADDON: CPT

## 2024-01-17 PROCEDURE — 81001 URINALYSIS AUTO W/SCOPE: CPT | Performed by: PHYSICIAN ASSISTANT

## 2024-01-17 PROCEDURE — 36415 COLL VENOUS BLD VENIPUNCTURE: CPT | Performed by: PHYSICIAN ASSISTANT

## 2024-01-17 PROCEDURE — 85025 COMPLETE CBC W/AUTO DIFF WBC: CPT | Performed by: PHYSICIAN ASSISTANT

## 2024-01-17 PROCEDURE — 96361 HYDRATE IV INFUSION ADD-ON: CPT

## 2024-01-17 PROCEDURE — 81025 URINE PREGNANCY TEST: CPT | Performed by: PHYSICIAN ASSISTANT

## 2024-01-17 PROCEDURE — 0241U HB NFCT DS VIR RESP RNA 4 TRGT: CPT | Performed by: PHYSICIAN ASSISTANT

## 2024-01-17 PROCEDURE — 99284 EMERGENCY DEPT VISIT MOD MDM: CPT | Performed by: PHYSICIAN ASSISTANT

## 2024-01-17 PROCEDURE — 80053 COMPREHEN METABOLIC PANEL: CPT | Performed by: PHYSICIAN ASSISTANT

## 2024-01-17 PROCEDURE — 83690 ASSAY OF LIPASE: CPT | Performed by: PHYSICIAN ASSISTANT

## 2024-01-17 PROCEDURE — 99283 EMERGENCY DEPT VISIT LOW MDM: CPT

## 2024-01-17 RX ORDER — KETOROLAC TROMETHAMINE 30 MG/ML
15 INJECTION, SOLUTION INTRAMUSCULAR; INTRAVENOUS ONCE
Status: COMPLETED | OUTPATIENT
Start: 2024-01-17 | End: 2024-01-17

## 2024-01-17 RX ORDER — ONDANSETRON 4 MG/1
4 TABLET, ORALLY DISINTEGRATING ORAL EVERY 8 HOURS PRN
Qty: 15 TABLET | Refills: 0 | Status: SHIPPED | OUTPATIENT
Start: 2024-01-17

## 2024-01-17 RX ORDER — ONDANSETRON 2 MG/ML
4 INJECTION INTRAMUSCULAR; INTRAVENOUS ONCE
Status: COMPLETED | OUTPATIENT
Start: 2024-01-17 | End: 2024-01-17

## 2024-01-17 RX ORDER — POTASSIUM CHLORIDE 20 MEQ/1
40 TABLET, EXTENDED RELEASE ORAL ONCE
Status: COMPLETED | OUTPATIENT
Start: 2024-01-17 | End: 2024-01-17

## 2024-01-17 RX ADMIN — POTASSIUM CHLORIDE 40 MEQ: 1500 TABLET, EXTENDED RELEASE ORAL at 17:36

## 2024-01-17 RX ADMIN — KETOROLAC TROMETHAMINE 15 MG: 30 INJECTION, SOLUTION INTRAMUSCULAR; INTRAVENOUS at 16:55

## 2024-01-17 RX ADMIN — SODIUM CHLORIDE 1000 ML: 0.9 INJECTION, SOLUTION INTRAVENOUS at 16:57

## 2024-01-17 RX ADMIN — ONDANSETRON 4 MG: 2 INJECTION INTRAMUSCULAR; INTRAVENOUS at 16:55

## 2024-01-17 NOTE — ED PROVIDER NOTES
History  Chief Complaint   Patient presents with    Vomiting     Pt reports vomiting since yesterday, headache, and fevers.      29 yo with fever. Onset yesterday. Associated with headache, nausea and vomiting. Epigastric abd pain. Headache started after vomiting. Denies sob or chest pain. Normal urination and normal BM. States her mother was recently sick with similar symptoms      History provided by:  Patient   used: No    Vomiting  Associated symptoms: abdominal pain, fever and headaches    Associated symptoms: no arthralgias, no chills, no cough and no sore throat        Prior to Admission Medications   Prescriptions Last Dose Informant Patient Reported? Taking?   albuterol (PROVENTIL HFA,VENTOLIN HFA) 90 mcg/act inhaler   No No   Sig: Inhale 2 puffs every 4 (four) hours as needed for wheezing.   albuterol (PROVENTIL HFA,VENTOLIN HFA) 90 mcg/act inhaler   No No   Sig: Inhale 2 puffs every 4 (four) hours as needed for wheezing or shortness of breath   calcium carbonate (OS-MAHENDRA) 1250 (500 Ca) MG chewable tablet   Yes No   Sig: Chew 500 mg daily   Patient not taking: Reported on 2023   fluticasone (FLONASE) 50 mcg/act nasal spray   No No   Si spray into each nostril 2 (two) times a day as needed for rhinitis   methylPREDNISolone 4 MG tablet therapy pack   No No   Sig: Use as directed on package   norgestimate-ethinyl estradiol (Sprintec 28) 0.25-35 MG-MCG per tablet   No No   Sig: Take 1 tablet by mouth daily Take continuously, skipping placebo pills.      Facility-Administered Medications: None       Past Medical History:   Diagnosis Date    Arthritis     Asthma     Endometriosis 10/19/2023    Lupus (HCC)     8 years    No known problems        Past Surgical History:   Procedure Laterality Date     SECTION       SECTION      NO PAST SURGERIES         Family History   Problem Relation Age of Onset    Breast cancer Neg Hx     Colon cancer Neg Hx      I have  reviewed and agree with the history as documented.    E-Cigarette/Vaping    E-Cigarette Use Never User      E-Cigarette/Vaping Substances     Social History     Tobacco Use    Smoking status: Never    Smokeless tobacco: Never   Vaping Use    Vaping status: Never Used   Substance Use Topics    Alcohol use: No    Drug use: No       Review of Systems   Constitutional:  Positive for fever. Negative for chills and fatigue.   HENT:  Negative for ear pain and sore throat.    Eyes:  Negative for pain and visual disturbance.   Respiratory:  Negative for cough and shortness of breath.    Cardiovascular:  Negative for chest pain and palpitations.   Gastrointestinal:  Positive for abdominal pain, nausea and vomiting.   Genitourinary:  Negative for dysuria and hematuria.   Musculoskeletal:  Negative for arthralgias and back pain.   Skin:  Negative for color change and rash.   Neurological:  Positive for headaches. Negative for seizures and syncope.   All other systems reviewed and are negative.      Physical Exam  Physical Exam  Vitals and nursing note reviewed.   Constitutional:       General: She is not in acute distress.     Appearance: She is well-developed.   HENT:      Head: Normocephalic and atraumatic.   Eyes:      Conjunctiva/sclera: Conjunctivae normal.   Cardiovascular:      Rate and Rhythm: Normal rate and regular rhythm.      Heart sounds: No murmur heard.  Pulmonary:      Effort: Pulmonary effort is normal. No respiratory distress.      Breath sounds: Normal breath sounds.   Abdominal:      Palpations: Abdomen is soft.      Tenderness: There is abdominal tenderness in the epigastric area.   Musculoskeletal:         General: No swelling.      Cervical back: Neck supple.   Skin:     General: Skin is warm and dry.      Capillary Refill: Capillary refill takes less than 2 seconds.   Neurological:      Mental Status: She is alert.   Psychiatric:         Mood and Affect: Mood normal.         Vital Signs  ED Triage Vitals  [01/17/24 1628]   Temperature Pulse Respirations Blood Pressure SpO2   (!) 101.3 °F (38.5 °C) 74 18 115/57 96 %      Temp Source Heart Rate Source Patient Position - Orthostatic VS BP Location FiO2 (%)   Temporal Monitor Sitting Left arm --      Pain Score       --           Vitals:    01/17/24 1628   BP: 115/57   Pulse: 74   Patient Position - Orthostatic VS: Sitting         Visual Acuity      ED Medications  Medications   ondansetron (ZOFRAN) injection 4 mg (4 mg Intravenous Given 1/17/24 1655)   ketorolac (TORADOL) injection 15 mg (15 mg Intravenous Given 1/17/24 1655)   sodium chloride 0.9 % bolus 1,000 mL (0 mL Intravenous Stopped 1/17/24 1808)   potassium chloride (K-DUR,KLOR-CON) CR tablet 40 mEq (40 mEq Oral Given 1/17/24 1736)       Diagnostic Studies  Results Reviewed       Procedure Component Value Units Date/Time    FLU/RSV/COVID - if FLU/RSV clinically relevant [[id number]]  (Abnormal) Collected: 01/17/24 1653    Lab Status: Final result Specimen: Nares from Nose Updated: 01/17/24 1744     SARS-CoV-2 Negative     INFLUENZA A PCR Positive     INFLUENZA B PCR Negative     RSV PCR Negative    Narrative:      FOR PEDIATRIC PATIENTS - copy/paste COVID Guidelines URL to browser: [email or web address]    SARS-CoV-2 assay is a Nucleic Acid Amplification assay intended for the  qualitative detection of nucleic acid from SARS-CoV-2 in nasopharyngeal  swabs. Results are for the presumptive identification of SARS-CoV-2 RNA.    Positive results are indicative of infection with SARS-CoV-2, the virus  causing COVID-19, but do not rule out bacterial infection or co-infection  with other viruses. Laboratories within the [place] and its  territories are required to report all positive results to the appropriate  public health authorities. Negative results do not preclude SARS-CoV-2  infection and should not be used as the sole basis for treatment or other  patient  management decisions. Negative results must be combined with  clinical observations, patient history, and epidemiological information.  This test has not been FDA cleared or approved.    This test has been authorized by FDA under an Emergency Use Authorization  (EUA). This test is only authorized for the duration of time the  declaration that circumstances exist justifying the authorization of the  emergency use of an in vitro diagnostic tests for detection of SARS-CoV-2  virus and/or diagnosis of COVID-19 infection under section 564(b)(1) of  the Act, 21 U.S.C. 360bbb-3(b)(1), unless the authorization is terminated  or revoked sooner. The test has been validated but independent review by FDA  and CLIA is pending.    Test performed using [employer] GeneXpert: This RT-PCR assay targets N2,  a region unique to SARS-CoV-2. A conserved region in the E-gene was chosen  for pan-Sarbecovirus detection which includes SARS-CoV-2.    According to CMS-2020-01-R, this platform meets the definition of high-throughput technology.    Comprehensive metabolic panel [[id number]]  (Abnormal) Collected: 01/17/24 1653    Lab Status: Final result Specimen: Blood from Arm, Right Updated: 01/17/24 1722     Sodium 134 mmol/L      Potassium 3.2 mmol/L      Chloride 100 mmol/L      CO2 24 mmol/L      ANION GAP 10 mmol/L      BUN 8 mg/dL      Creatinine 0.75 mg/dL      Glucose 105 mg/dL      Calcium 8.9 mg/dL      AST 16 U/L      ALT 10 U/L      Alkaline Phosphatase 42 U/L      Total Protein 7.1 g/dL      Albumin 3.9 g/dL      Total Bilirubin 0.44 mg/dL      eGFR 108 ml/min/1.73sq m     Narrative:      National Kidney Disease Foundation guidelines for Chronic Kidney Disease (CKD):     Stage 1 with normal or high GFR (GFR > 90 mL/min/1.73 square meters)    Stage 2 Mild CKD (GFR = 60-89 mL/min/1.73 square meters)    Stage 3A Moderate CKD (GFR = 45-59 mL/min/1.73 square meters)    Stage 3B Moderate CKD (GFR = 30-44 mL/min/1.73 square meters)    Stage  4 Severe CKD (GFR = 15-29 mL/min/1.73 square meters)    Stage 5 End Stage CKD (GFR <15 mL/min/1.73 square meters)  Note: GFR calculation is accurate only with a steady state creatinine    Lipase [[id number]]  (Abnormal) Collected: 01/17/24 1653    Lab Status: Final result Specimen: Blood from Arm, Right Updated: 01/17/24 1722     Lipase 8 u/L     CBC and differential [[id number]]  (Abnormal) Collected: 01/17/24 1653    Lab Status: Final result Specimen: Blood from Arm, Right Updated: 01/17/24 1704     WBC 5.17 Thousand/uL      RBC 4.10 Million/uL      Hemoglobin 13.2 g/dL      Hematocrit 37.9 %      MCV 92 fL      MCH 32.2 pg      MCHC 34.8 g/dL      RDW 12.1 %      MPV 8.8 fL      Platelets 277 Thousands/uL      nRBC 0 /100 WBCs      Neutrophils Relative 88 %      Immat GRANS % 0 %      Lymphocytes Relative 5 %      Monocytes Relative 7 %      Eosinophils Relative 0 %      Basophils Relative 0 %      Neutrophils Absolute 4.52 Thousands/µL      Immature Grans Absolute 0.02 Thousand/uL      Lymphocytes Absolute 0.24 Thousands/µL      Monocytes Absolute 0.37 Thousand/µL      Eosinophils Absolute 0.02 Thousand/µL      Basophils Absolute 0.00 Thousands/µL     Urine Microscopic [[id number]]  (Abnormal) Collected: 01/17/24 1648    Lab Status: Final result Specimen: Urine, Clean Catch Updated: 01/17/24 1700     RBC, UA Innumerable /hpf      WBC, UA 2-4 /hpf      Epithelial Cells Occasional /hpf      Bacteria, UA None Seen /hpf      MUCUS THREADS Occasional    UA w Reflex to Microscopic w Reflex to Culture [[id number]]  (Abnormal) Collected: 01/17/24 1648    Lab Status: Final result Specimen: Urine, Clean Catch Updated: 01/17/24 1656     Color, UA Yellow     Clarity, UA Clear     Specific Gravity, UA 1.029     pH, UA 5.5     Leukocytes, UA Negative     Nitrite, UA Negative     Protein, UA 30 (1+) mg/dl      Glucose, UA Negative mg/dl      Ketones, UA 40 (2+) mg/dl      Urobilinogen, UA <2.0 mg/dl      Bilirubin, UA Negative      Occult Blood, UA Large    POCT pregnancy, urine [[id number]]  (Normal) Resulted: 01/17/24 1649    Lab Status: Final result Updated: 01/17/24 1649     EXT Preg Test, Ur Negative     Control Valid                   No orders to display              Procedures  Procedures         ED Course                                             Medical Decision Making  Ddx includes but is not limited to viral syndrome, flu, covid, rsv.     MDM: 27 yo with n/v. Fever. Flu positive. Recommended PCP f/u. Return parameters provided. Pt understands and agrees with plan.      Amount and/or Complexity of Data Reviewed  Labs: ordered.    Risk  Prescription drug management.             Disposition  Final diagnoses:   Influenza     Time reflects when diagnosis was documented in both MDM as applicable and the Disposition within this note       Time User Action Codes Description Comment    1/17/2024  6:05 PM Jose Armando Harper Add [J11.1] Influenza           ED Disposition       ED Disposition   Discharge    Condition   Stable    Date/Time   Wed Jan 17, 2024  6:05 PM    Comment   Maricarmen Garcia discharge to home/self care.                   Follow-up Information       Follow up With Specialties Details Why Contact Info Additional Information    [facility] Emergency Department Emergency Medicine Go to  If symptoms worsen [address] [facility] [id number] [phone] [facility] Emergency Department, [address] [place], [place], [address]            Patient's Medications   Discharge Prescriptions    ONDANSETRON (ZOFRAN-ODT) 4 MG DISINTEGRATING TABLET    Take 1 tablet (4 mg total) by mouth every 8 (eight) hours as needed for nausea or vomiting       Start Date: 1/17/2024 End Date: --       Order Dose: 4 mg       Quantity: 15 tablet    Refills: 0       No discharge procedures on file.    PDMP Review       None            ED Provider  Electronically Signed by             Jose Armando CRAWFORD  DALIA Harper  01/17/24 1822

## 2024-01-25 ENCOUNTER — OFFICE VISIT (OUTPATIENT)
Dept: OBGYN CLINIC | Facility: CLINIC | Age: 29
End: 2024-01-25

## 2024-01-25 VITALS
RESPIRATION RATE: 18 BRPM | HEART RATE: 88 BPM | SYSTOLIC BLOOD PRESSURE: 122 MMHG | WEIGHT: 121 LBS | HEIGHT: 59 IN | BODY MASS INDEX: 24.39 KG/M2 | DIASTOLIC BLOOD PRESSURE: 76 MMHG

## 2024-01-25 DIAGNOSIS — N92.1 BREAKTHROUGH BLEEDING ON OCPS: Primary | ICD-10-CM

## 2024-01-25 DIAGNOSIS — N94.6 DYSMENORRHEA: ICD-10-CM

## 2024-01-25 DIAGNOSIS — N80.9 ENDOMETRIOSIS: ICD-10-CM

## 2024-01-25 PROCEDURE — 99213 OFFICE O/P EST LOW 20 MIN: CPT | Performed by: NURSE PRACTITIONER

## 2024-01-25 RX ORDER — DROSPIRENONE AND ETHINYL ESTRADIOL 0.02-3(28)
1 KIT ORAL DAILY
Qty: 28 TABLET | Refills: 11 | Status: SHIPPED | OUTPATIENT
Start: 2024-01-25 | End: 2024-01-31 | Stop reason: SDUPTHER

## 2024-01-25 NOTE — PROGRESS NOTES
PROBLEM GYNECOLOGICAL VISIT    Maricarmen Garcia is a 28 y.o. female who presents today with complaint of breakthrough bleeding on OCPs.  Her general medical history has been reviewed and she reports it as follows:    Past Medical History:   Diagnosis Date    Arthritis     Asthma     Endometriosis 10/19/2023    Lupus (HCC)     8 years    No known problems      Past Surgical History:   Procedure Laterality Date     SECTION       SECTION      NO PAST SURGERIES       OB History          2    Para   2    Term   2            AB        Living   2         SAB        IAB        Ectopic        Multiple        Live Births                   Social History     Tobacco Use    Smoking status: Never    Smokeless tobacco: Never   Vaping Use    Vaping status: Never Used   Substance Use Topics    Alcohol use: No    Drug use: No     Social History     Substance and Sexual Activity   Sexual Activity Yes    Partners: Male    Birth control/protection: OCP       Current Outpatient Medications   Medication Instructions    albuterol (PROVENTIL HFA,VENTOLIN HFA) 90 mcg/act inhaler 2 puffs, Inhalation, Every 4 hours PRN    albuterol (PROVENTIL HFA,VENTOLIN HFA) 90 mcg/act inhaler 2 puffs, Inhalation, Every 4 hours PRN    calcium carbonate (OS-MAHENDRA) 500 mg, Daily    fluticasone (FLONASE) 50 mcg/act nasal spray 1 spray, Nasal, 2 times daily PRN    methylPREDNISolone 4 MG tablet therapy pack Use as directed on package    norgestimate-ethinyl estradiol (Sprintec 28) 0.25-35 MG-MCG per tablet 1 tablet, Oral, Daily, Take continuously, skipping placebo pills.    ondansetron (ZOFRAN-ODT) 4 mg, Oral, Every 8 hours PRN       History of Present Illness:   Maricarmen presents today reporting breakthrough bleeding on OCPs. She was started on Sprintec in 2023 and reports only a total of two weeks in that time without any bleeding or spotting. At that visit she was a new patient to our office and had reported she had  "been taking Apri OCP, but had also been experiencing continual breakthrough bleeding on this pill. She reports that she was initially started on the OCPs to control heavy and painful periods. She had been told that this was caused by suspected endometriosis (she has had pelvic US in the past at her previous GYN which per patient showed excess tissue, did not ever have any laparoscopic procedures or direct visualization).     Review of Systems:  Review of Systems   Constitutional: Negative.    Gastrointestinal: Negative.    Genitourinary:  Positive for vaginal bleeding.       Physical Exam:  /76 (BP Location: Right arm, Patient Position: Sitting, Cuff Size: Adult)   Pulse 88   Resp 18   Ht 4' 11\" (1.499 m)   Wt 54.9 kg (121 lb)   LMP 01/25/2024   BMI 24.44 kg/m²   Physical Exam  Constitutional:       General: She is not in acute distress.     Appearance: Normal appearance.   Neurological:      Mental Status: She is alert.   Skin:     General: Skin is warm and dry.   Psychiatric:         Mood and Affect: Mood normal.         Behavior: Behavior normal.   Vitals reviewed.       Assessment:   1. Breakthrough bleeding on OCPs    2. Dysmenorrhea     Plan:   1. We discussed option for changing OCP vs changing method of contraception for management of menses including depo-provera, nuvaring, Mirena.    2. We discussed endometriosis and that confirming diagnosis is performed via laparoscopic visualization and she is hoping to avoid that at this time.    3. Recommended repeat US to follow up on previously reported abnormal US. Orders placed.    4. Patient has questionable history of Lupus, though has had normal thrombotic risk/acquired antiphospholipid panel, making [facility] MEC Category 2 and safe to continue. She would like to trial a different OCP at this time. If breakthrough bleeding continues she may consider trying depo-provera.    5. Return in 3 months for follow up.     Reviewed with patient that test results " are available in [facility] immediately, but that they will not necessarily be reviewed by me immediately.  Explained that I will review results at my earliest opportunity and contact patient appropriately.

## 2024-01-26 ENCOUNTER — HOSPITAL ENCOUNTER (OUTPATIENT)
Dept: RADIOLOGY | Facility: HOSPITAL | Age: 29
Discharge: HOME | End: 2024-01-26
Payer: COMMERCIAL

## 2024-01-26 DIAGNOSIS — N80.9 ENDOMETRIOSIS: ICD-10-CM

## 2024-01-26 DIAGNOSIS — N94.6 DYSMENORRHEA: ICD-10-CM

## 2024-01-26 PROCEDURE — 76830 TRANSVAGINAL US NON-OB: CPT

## 2024-01-26 PROCEDURE — 76856 US EXAM PELVIC COMPLETE: CPT

## 2024-01-31 DIAGNOSIS — N94.6 DYSMENORRHEA: ICD-10-CM

## 2024-01-31 DIAGNOSIS — N92.1 BREAKTHROUGH BLEEDING ON OCPS: ICD-10-CM

## 2024-01-31 RX ORDER — DROSPIRENONE AND ETHINYL ESTRADIOL 0.02-3(28)
1 KIT ORAL DAILY
Qty: 84 TABLET | Refills: 3 | Status: SHIPPED | OUTPATIENT
Start: 2024-01-31

## 2024-02-19 ENCOUNTER — OFFICE VISIT (OUTPATIENT)
Dept: URGENT CARE | Facility: CLINIC | Age: 29
End: 2024-02-19
Payer: COMMERCIAL

## 2024-02-19 VITALS
SYSTOLIC BLOOD PRESSURE: 108 MMHG | OXYGEN SATURATION: 98 % | TEMPERATURE: 99.7 F | DIASTOLIC BLOOD PRESSURE: 58 MMHG | WEIGHT: 121 LBS | BODY MASS INDEX: 24.44 KG/M2 | HEART RATE: 101 BPM | RESPIRATION RATE: 20 BRPM

## 2024-02-19 DIAGNOSIS — J06.9 VIRAL URI WITH COUGH: Primary | ICD-10-CM

## 2024-02-19 PROCEDURE — 99213 OFFICE O/P EST LOW 20 MIN: CPT | Performed by: ORTHOPAEDIC SURGERY

## 2024-02-19 RX ORDER — BENZONATATE 200 MG/1
200 CAPSULE ORAL 3 TIMES DAILY PRN
Qty: 20 CAPSULE | Refills: 0 | Status: SHIPPED | OUTPATIENT
Start: 2024-02-19

## 2024-02-19 NOTE — PATIENT INSTRUCTIONS
Most upper respiratory infections are viral and resolve on their own within 10-14 days. Antibiotics are not indicated for the viral infection, and are only prescribed if there is evidence for a bacterial infection. Sometimes an upper respiratory infection may lead to secondary bacterial infection, such as bacterial sinusitis, in which case antibiotics would be indicated at that time. If your symptoms continue beyond 10-14 days or if you experience ongoing fevers, productive cough with green, brown, bloody phlegm production, you may have developed a bacterial infection. For the uncomplicated viral upper respiratory infection conservative management includes:    Fever and pain control:  Ibuprofen (Motrin) 600mg every 6 hours for fever, headaches, body aches   Ibuprofen is an NSAID. Please stop medication if you experience stomach/abdominal pain and report to your primary care provider.   Ask your primary care provider before you take NSAIDs if you are on any blood thinners, or if you have a history of heart disease, kidney disease, gastric bypass surgery, GI bleed, or poorly controlled high blood pressure.   May use acetaminophen (Tylenol) as directed on the bottle between doses of ibuprofen. Do not exceed 4,000mg of Tylenol a day.   Cough & Congestion:  Guaifenesin (Mucinex) as directed on the bottle for congestion and mucous-y cough.   Dextromethorphan (Delsym, Robitussin) for dry cough and cough suppression   Pseudoephedrine (Sudafed) for congestion and sinus pressure   Sudafed may cause increased heart rate, irregular heart rate, and an increase in blood pressure. Please do not take Sudafed if you have a history of heart disease or high blood pressure.   Sudafed should not be taken if you are on anti-depressants such as those belonging to the class MAOIs or tricyclics.  Coricidin HBP (chlorpheniramine maleate) can be used as a decongestant in place of other options for those unable to take Sudafed.   Combination  cough and cold such as Dimetapp and Mucinex DM also available  Sudafed PE Head Congestion +Flu Severe contains a combination of Sudafed, Tylenol, Mucinex, and Delsym  If prescribed, take Tessalon Pearles or Bromfed/Phenergan DM as directed  Avoid taking prescription cough/congestion medication and OTC options at the same time  Sore Throat:  Cepacol lozenges  Chloraseptic spray  Throat Coat tea  Warm salt water gargles   Vitamin/Minerals:  Vitamin D3 2,000 IU daily  Vitamin C 1000mg twice a day  Some studies suggest that Zinc 12.5-15mg every 2 hours while awake for 5 days may shorten symptom duration by 1-2 days  Other:   Plenty of fluids and rest  Cool mist humidifiers  Nasal sinus rinses such as NettiPot, Neimed, or Navage can be used to help flush out sinuses  Please only use distilled/sterile water that can be purchased at your local pharmacy  Nasal spray options:  Nasal steroid sprays such as Flonase, Nasonex, Nasacort may help with sinus congestion, itchy/watery eyes, clogged ears  These options must be used consistently for at least 2 weeks for full effect  Afrin nasal spray for quick acting congestion relief  Saline nasal spray for dry nose, irritation of the nasal passages  Follow up with PCP in 3-5 days  Proceed to the ED if symptoms worsen

## 2024-02-19 NOTE — PROGRESS NOTES
[facility]        NAME: Maricarmen Garcia is a 28 y.o. female  : 1995    MRN: [id number]  DATE: 2024  TIME: 12:11 PM    Assessment and Plan   Viral URI with cough [J06.9]  1. Viral URI with cough  benzonatate (TESSALON) 200 MG capsule            Patient Instructions       Most upper respiratory infections are viral and resolve on their own within 10-14 days. Antibiotics are not indicated for the viral infection, and are only prescribed if there is evidence for a bacterial infection. Sometimes an upper respiratory infection may lead to secondary bacterial infection, such as bacterial sinusitis, in which case antibiotics would be indicated at that time. If your symptoms continue beyond 10-14 days or if you experience ongoing fevers, productive cough with green, brown, bloody phlegm production, you may have developed a bacterial infection. For the uncomplicated viral upper respiratory infection conservative management includes:    Fever and pain control:  Ibuprofen (Motrin) 600mg every 6 hours for fever, headaches, body aches   Ibuprofen is an NSAID. Please stop medication if you experience stomach/abdominal pain and report to your primary care provider.   Ask your primary care provider before you take NSAIDs if you are on any blood thinners, or if you have a history of heart disease, kidney disease, gastric bypass surgery, GI bleed, or poorly controlled high blood pressure.   May use acetaminophen (Tylenol) as directed on the bottle between doses of ibuprofen. Do not exceed 4,000mg of Tylenol a day.   Cough & Congestion:  Guaifenesin (Mucinex) as directed on the bottle for congestion and mucous-y cough.   Dextromethorphan (Delsym, Robitussin) for dry cough and cough suppression   Pseudoephedrine (Sudafed) for congestion and sinus pressure   Sudafed may cause increased heart rate, irregular heart rate, and an increase in blood pressure. Please do not take Sudafed if you have a history of heart  disease or high blood pressure.   Sudafed should not be taken if you are on anti-depressants such as those belonging to the class MAOIs or tricyclics.  Coricidin HBP (chlorpheniramine maleate) can be used as a decongestant in place of other options for those unable to take Sudafed.   Combination cough and cold such as Dimetapp and Mucinex DM also available  Sudafed PE Head Congestion +Flu Severe contains a combination of Sudafed, Tylenol, Mucinex, and Delsym  If prescribed, take Tessalon Pearles or Bromfed/Phenergan DM as directed  Avoid taking prescription cough/congestion medication and OTC options at the same time  Sore Throat:  Cepacol lozenges  Chloraseptic spray  Throat Coat tea  Warm salt water gargles   Vitamin/Minerals:  Vitamin D3 2,000 IU daily  Vitamin C 1000mg twice a day  Some studies suggest that Zinc 12.5-15mg every 2 hours while awake for 5 days may shorten symptom duration by 1-2 days  Other:   Plenty of fluids and rest  Cool mist humidifiers  Nasal sinus rinses such as NettiPot, Neimed, or Navage can be used to help flush out sinuses  Please only use distilled/sterile water that can be purchased at your local pharmacy  Nasal spray options:  Nasal steroid sprays such as Flonase, Nasonex, Nasacort may help with sinus congestion, itchy/watery eyes, clogged ears  These options must be used consistently for at least 2 weeks for full effect  Afrin nasal spray for quick acting congestion relief  Saline nasal spray for dry nose, irritation of the nasal passages  Follow up with PCP in 3-5 days  Proceed to the ED if symptoms worsen     Chief Complaint     Chief Complaint   Patient presents with    Sinusitis     Sinus congestion headache with green nasal drainage. Also post nasal drip. started 2-3 days ago. Took Dayquil yesterday which helped          History of Present Illness       28-year-old female presents to urgent care for evaluation of congestion, postnasal drip.  Mom states symptoms began a couple  of days ago.  She denies any recordable fevers at home.  She denies any episodes of nausea/vomiting/diarrhea.  She does complain that her mucus is green in color.  For symptom relief she has been using DayQuil.        Review of Systems   Review of Systems   Constitutional:  Negative for chills and fever.   HENT:  Positive for congestion, postnasal drip and rhinorrhea. Negative for ear pain and sore throat.    Eyes:  Negative for pain and visual disturbance.   Respiratory:  Negative for cough and shortness of breath.    Cardiovascular:  Negative for chest pain and palpitations.   Gastrointestinal:  Negative for abdominal pain, diarrhea, nausea and vomiting.   Genitourinary:  Negative for dysuria and hematuria.   Musculoskeletal:  Negative for arthralgias and back pain.   Skin:  Negative for color change and rash.   Neurological:  Negative for dizziness, seizures, syncope, weakness, light-headedness and headaches.   Psychiatric/Behavioral: Negative.     All other systems reviewed and are negative.        Current Medications       Current Outpatient Medications:     albuterol (PROVENTIL HFA,VENTOLIN HFA) 90 mcg/act inhaler, Inhale 2 puffs every 4 (four) hours as needed for wheezing or shortness of breath, Disp: 6.7 g, Rfl: 0    benzonatate (TESSALON) 200 MG capsule, Take 1 capsule (200 mg total) by mouth 3 (three) times a day as needed for cough, Disp: 20 capsule, Rfl: 0    drospirenone-ethinyl estradiol (LUIS FERNANDO) 3-0.02 MG per tablet, Take 1 tablet by mouth daily, Disp: 84 tablet, Rfl: 3    albuterol (PROVENTIL HFA,VENTOLIN HFA) 90 mcg/act inhaler, Inhale 2 puffs every 4 (four) hours as needed for wheezing. (Patient not taking: Reported on 2/19/2024), Disp: 1 Inhaler, Rfl: 0    calcium carbonate (OS-MAHENDRA) 1250 (500 Ca) MG chewable tablet, Chew 500 mg daily (Patient not taking: Reported on 11/8/2023), Disp: , Rfl:     fluticasone (FLONASE) 50 mcg/act nasal spray, 1 spray into each nostril 2 (two) times a day as needed for  rhinitis (Patient not taking: Reported on 2024), Disp: 16 g, Rfl: 0    methylPREDNISolone 4 MG tablet therapy pack, Use as directed on package (Patient not taking: Reported on 2024), Disp: 21 each, Rfl: 0    ondansetron (ZOFRAN-ODT) 4 mg disintegrating tablet, Take 1 tablet (4 mg total) by mouth every 8 (eight) hours as needed for nausea or vomiting (Patient not taking: Reported on 2024), Disp: 15 tablet, Rfl: 0    Current Allergies     Allergies as of 2024 - Reviewed 2024   Allergen Reaction Noted    Amoxicillin-pot clavulanate GI Intolerance 2022    Benzocaine Swelling 04/15/2020    Erythromycin  2016    Erythromycin base Nausea Only 2015    Latex Hives 2022    Lidocaine  2016    Other  2015            The following portions of the patient's history were reviewed and updated as appropriate: allergies, current medications, past family history, past medical history, past social history, past surgical history and problem list.     Past Medical History:   Diagnosis Date    Arthritis     Asthma     Endometriosis 10/19/2023    Lupus (HCC)     8 years    No known problems        Past Surgical History:   Procedure Laterality Date     SECTION       SECTION      NO PAST SURGERIES         Family History   Problem Relation Age of Onset    Breast cancer Neg Hx     Colon cancer Neg Hx          Medications have been verified.        Objective   /58   Pulse 101   Temp 99.7 °F (37.6 °C)   Resp 20   Wt 54.9 kg (121 lb)   LMP 2024   SpO2 98%   BMI 24.44 kg/m²        Physical Exam     Physical Exam  Vitals and nursing note reviewed.   Constitutional:       General: She is not in acute distress.     Appearance: Normal appearance. She is not ill-appearing.   HENT:      Head: Normocephalic and atraumatic.      Right Ear: Tympanic membrane normal.      Left Ear: Tympanic membrane normal.      Nose: Nose normal.      Mouth/Throat:       Pharynx: Oropharynx is clear. No oropharyngeal exudate or posterior oropharyngeal erythema.   Eyes:      Extraocular Movements: Extraocular movements intact.      Pupils: Pupils are equal, round, and reactive to light.   Cardiovascular:      Rate and Rhythm: Normal rate and regular rhythm.      Pulses: Normal pulses.      Heart sounds: Normal heart sounds.   Pulmonary:      Effort: Pulmonary effort is normal. No respiratory distress.      Breath sounds: Normal breath sounds. No wheezing or rhonchi.   Abdominal:      Palpations: Abdomen is soft.   Musculoskeletal:         General: Normal range of motion.      Cervical back: Normal range of motion.   Skin:     General: Skin is warm and dry.      Capillary Refill: Capillary refill takes less than 2 seconds.   Neurological:      General: No focal deficit present.      Mental Status: She is alert and oriented to person, place, and time.   Psychiatric:         Mood and Affect: Mood normal.         Behavior: Behavior normal.

## 2024-02-21 PROBLEM — B96.89 ACUTE BACTERIAL BRONCHITIS: Status: RESOLVED | Noted: 2018-12-27 | Resolved: 2024-02-21

## 2024-02-21 PROBLEM — J20.8 ACUTE BACTERIAL BRONCHITIS: Status: RESOLVED | Noted: 2018-12-27 | Resolved: 2024-02-21

## 2024-03-15 ENCOUNTER — OFFICE VISIT (OUTPATIENT)
Dept: URGENT CARE | Facility: CLINIC | Age: 29
End: 2024-03-15
Payer: COMMERCIAL

## 2024-03-15 VITALS
SYSTOLIC BLOOD PRESSURE: 114 MMHG | OXYGEN SATURATION: 97 % | DIASTOLIC BLOOD PRESSURE: 77 MMHG | BODY MASS INDEX: 24.48 KG/M2 | HEART RATE: 78 BPM | TEMPERATURE: 97.7 F | WEIGHT: 121.2 LBS | RESPIRATION RATE: 18 BRPM

## 2024-03-15 DIAGNOSIS — H10.32 ACUTE CONJUNCTIVITIS OF LEFT EYE, UNSPECIFIED ACUTE CONJUNCTIVITIS TYPE: Primary | ICD-10-CM

## 2024-03-15 PROCEDURE — 99213 OFFICE O/P EST LOW 20 MIN: CPT | Performed by: PHYSICIAN ASSISTANT

## 2024-03-15 RX ORDER — OFLOXACIN 3 MG/ML
1 SOLUTION/ DROPS OPHTHALMIC 4 TIMES DAILY
Qty: 5 ML | Refills: 0 | Status: SHIPPED | OUTPATIENT
Start: 2024-03-15 | End: 2024-03-22

## 2024-03-15 NOTE — PATIENT INSTRUCTIONS
Conjunctivitis  Ocuflox as directed  Follow up with PCP in 3-5 days.  Proceed to  ER if symptoms worsen.

## 2024-03-15 NOTE — PROGRESS NOTES
[facility]        NAME: Maricarmen Garcia is a 28 y.o. female  : 1995    MRN: [id number]  DATE: March 15, 2024  TIME: 11:59 AM    Assessment and Plan   Acute conjunctivitis of left eye, unspecified acute conjunctivitis type [H10.32]  1. Acute conjunctivitis of left eye, unspecified acute conjunctivitis type  ofloxacin (OCUFLOX) 0.3 % ophthalmic solution            Patient Instructions     Conjunctivitis  Ocuflox as directed  Follow up with PCP in 3-5 days.  Proceed to  ER if symptoms worsen.    Chief Complaint     Chief Complaint   Patient presents with    Conjunctivitis     Patient here with pain and itchiness in her left eye. Left eye also slightly swollen. Two other people in her house currently have pink eye, so she thinks that's what it may be.          History of Present Illness       28-year-old female who presents complaining of left eye being injected and having discharge.  Patient states that multiple family members have been diagnosed with conjunctivitis.  Denies visual disturbances, trauma, pain, photosensitivity.    Conjunctivitis   Associated symptoms include eye discharge and eye redness. Pertinent negatives include no eye itching, no photophobia, no stridor, no cough, no wheezing and no eye pain.       Review of Systems   Review of Systems   Constitutional: Negative.    HENT: Negative.     Eyes:  Positive for discharge and redness. Negative for photophobia, pain, itching and visual disturbance.   Respiratory: Negative.  Negative for cough, chest tightness, shortness of breath, wheezing and stridor.    Cardiovascular: Negative.  Negative for chest pain, palpitations and leg swelling.         Current Medications       Current Outpatient Medications:     albuterol (PROVENTIL HFA,VENTOLIN HFA) 90 mcg/act inhaler, Inhale 2 puffs every 4 (four) hours as needed for wheezing or shortness of breath, Disp: 6.7 g, Rfl: 0    drospirenone-ethinyl estradiol (LUIS FERNANDO) 3-0.02 MG per tablet, Take 1 tablet  by mouth daily, Disp: 84 tablet, Rfl: 3    ofloxacin (OCUFLOX) 0.3 % ophthalmic solution, Administer 1 drop into the left eye 4 (four) times a day for 7 days, Disp: 5 mL, Rfl: 0    albuterol (PROVENTIL HFA,VENTOLIN HFA) 90 mcg/act inhaler, Inhale 2 puffs every 4 (four) hours as needed for wheezing. (Patient not taking: Reported on 2/19/2024), Disp: 1 Inhaler, Rfl: 0    benzonatate (TESSALON) 200 MG capsule, Take 1 capsule (200 mg total) by mouth 3 (three) times a day as needed for cough (Patient not taking: Reported on 3/15/2024), Disp: 20 capsule, Rfl: 0    calcium carbonate (OS-MAHENDRA) 1250 (500 Ca) MG chewable tablet, Chew 500 mg daily (Patient not taking: Reported on 11/8/2023), Disp: , Rfl:     fluticasone (FLONASE) 50 mcg/act nasal spray, 1 spray into each nostril 2 (two) times a day as needed for rhinitis (Patient not taking: Reported on 1/25/2024), Disp: 16 g, Rfl: 0    methylPREDNISolone 4 MG tablet therapy pack, Use as directed on package (Patient not taking: Reported on 1/25/2024), Disp: 21 each, Rfl: 0    ondansetron (ZOFRAN-ODT) 4 mg disintegrating tablet, Take 1 tablet (4 mg total) by mouth every 8 (eight) hours as needed for nausea or vomiting (Patient not taking: Reported on 1/25/2024), Disp: 15 tablet, Rfl: 0    Current Allergies     Allergies as of 03/15/2024 - Reviewed 03/15/2024   Allergen Reaction Noted    Amoxicillin-pot clavulanate GI Intolerance 01/26/2022    Benzocaine Swelling 04/15/2020    Erythromycin  09/16/2016    Erythromycin base Nausea Only 11/16/2015    Latex Hives 01/26/2022    Lidocaine  09/16/2016    Other  11/16/2015            The following portions of the patient's history were reviewed and updated as appropriate: allergies, current medications, past family history, past medical history, past social history, past surgical history and problem list.     Past Medical History:   Diagnosis Date    Arthritis     Asthma     Endometriosis 10/19/2023    Lupus (HCC)     8 years    No known  problems        Past Surgical History:   Procedure Laterality Date     SECTION       SECTION      NO PAST SURGERIES         Family History   Problem Relation Age of Onset    Breast cancer Neg Hx     Colon cancer Neg Hx          Medications have been verified.        Objective   /77   Pulse 78   Temp 97.7 °F (36.5 °C)   Resp 18   Wt 55 kg (121 lb 3.2 oz)   SpO2 97%   BMI 24.48 kg/m²        Physical Exam     Physical Exam  Constitutional:       Appearance: She is well-developed.   HENT:      Head: Normocephalic and atraumatic.      Right Ear: External ear normal.      Left Ear: External ear normal.      Nose: Nose normal.      Mouth/Throat:      Pharynx: No oropharyngeal exudate.   Eyes:      General: Lids are normal. Vision grossly intact. Gaze aligned appropriately. No allergic shiner, visual field deficit or scleral icterus.     Extraocular Movements: Extraocular movements intact.      Conjunctiva/sclera:      Right eye: Right conjunctiva is not injected. No chemosis, exudate or hemorrhage.     Left eye: Left conjunctiva is injected. Exudate present. No chemosis or hemorrhage.  Cardiovascular:      Rate and Rhythm: Normal rate and regular rhythm.      Heart sounds: Normal heart sounds.   Pulmonary:      Effort: Pulmonary effort is normal. No respiratory distress.      Breath sounds: Normal breath sounds. No wheezing or rales.   Chest:      Chest wall: No tenderness.   Musculoskeletal:      Cervical back: Normal range of motion and neck supple.   Lymphadenopathy:      Cervical: No cervical adenopathy.

## 2024-03-24 ENCOUNTER — OFFICE VISIT (OUTPATIENT)
Dept: URGENT CARE | Facility: CLINIC | Age: 29
End: 2024-03-24
Payer: COMMERCIAL

## 2024-03-24 VITALS
BODY MASS INDEX: 24.6 KG/M2 | OXYGEN SATURATION: 99 % | RESPIRATION RATE: 17 BRPM | DIASTOLIC BLOOD PRESSURE: 60 MMHG | WEIGHT: 122 LBS | TEMPERATURE: 97.5 F | HEART RATE: 101 BPM | HEIGHT: 59 IN | SYSTOLIC BLOOD PRESSURE: 110 MMHG

## 2024-03-24 DIAGNOSIS — H69.91 DISORDER OF RIGHT EUSTACHIAN TUBE: Primary | ICD-10-CM

## 2024-03-24 PROCEDURE — 99213 OFFICE O/P EST LOW 20 MIN: CPT | Performed by: PHYSICIAN ASSISTANT

## 2024-03-24 NOTE — PROGRESS NOTES
[facility]        NAME: Maricarmen Garcia is a 28 y.o. female  : 1995    MRN: [id number]  DATE: 2024  TIME: 10:53 AM    Assessment and Plan   Disorder of right eustachian tube [H69.91]  1. Disorder of right eustachian tube              Patient Instructions   Flonase Sudafed Mucinex.  Follow-up with PCP as needed  If tests have been performed at [facility], our office will contact you with results if changes need to be made to the care plan discussed with you at the visit.  You can review your full results on [facility]  Follow up with PCP in 3-5 days.  Proceed to  ER if symptoms worsen.    Chief Complaint     Chief Complaint   Patient presents with    Earache     Pt c/o right sided earache for 2-3 days and last night when swallowing started feeling a popping sound. No OTC meds taken,          History of Present Illness       HPI  This is a 28-year-old female here complaining of right ear pain for the last 2 to 3 days.  She also notes a sore throat and some nasal congestion.  She denies fever, vomiting, diarrhea, shortness of breath, chest pain or cough.  SHe has not taken any medicines for symptoms.  Review of Systems   Review of Systems   Constitutional:  Negative for fever.   HENT:  Positive for congestion, ear pain and sore throat.    Respiratory:  Negative for cough and shortness of breath.    Cardiovascular:  Negative for chest pain.   Gastrointestinal:  Negative for diarrhea and vomiting.         Current Medications       Current Outpatient Medications:     albuterol (PROVENTIL HFA,VENTOLIN HFA) 90 mcg/act inhaler, Inhale 2 puffs every 4 (four) hours as needed for wheezing or shortness of breath, Disp: 6.7 g, Rfl: 0    drospirenone-ethinyl estradiol (LUIS FERNANDO) 3-0.02 MG per tablet, Take 1 tablet by mouth daily, Disp: 84 tablet, Rfl: 3    albuterol (PROVENTIL HFA,VENTOLIN HFA) 90 mcg/act inhaler, Inhale 2 puffs every 4 (four) hours as needed for wheezing. (Patient not taking: Reported on  2024), Disp: 1 Inhaler, Rfl: 0    benzonatate (TESSALON) 200 MG capsule, Take 1 capsule (200 mg total) by mouth 3 (three) times a day as needed for cough (Patient not taking: Reported on 3/15/2024), Disp: 20 capsule, Rfl: 0    calcium carbonate (OS-MAHENDRA) 1250 (500 Ca) MG chewable tablet, Chew 500 mg daily (Patient not taking: Reported on 2023), Disp: , Rfl:     fluticasone (FLONASE) 50 mcg/act nasal spray, 1 spray into each nostril 2 (two) times a day as needed for rhinitis (Patient not taking: Reported on 2024), Disp: 16 g, Rfl: 0    methylPREDNISolone 4 MG tablet therapy pack, Use as directed on package (Patient not taking: Reported on 2024), Disp: 21 each, Rfl: 0    ondansetron (ZOFRAN-ODT) 4 mg disintegrating tablet, Take 1 tablet (4 mg total) by mouth every 8 (eight) hours as needed for nausea or vomiting (Patient not taking: Reported on 2024), Disp: 15 tablet, Rfl: 0    Current Allergies     Allergies as of 2024 - Reviewed 2024   Allergen Reaction Noted    Amoxicillin-pot clavulanate GI Intolerance 2022    Benzocaine Swelling 04/15/2020    Erythromycin  2016    Erythromycin base Nausea Only 2015    Latex Hives 2022    Lidocaine  2016    Other  2015            The following portions of the patient's history were reviewed and updated as appropriate: allergies, current medications, past family history, past medical history, past social history, past surgical history and problem list.     Past Medical History:   Diagnosis Date    Arthritis     Asthma     Endometriosis 10/19/2023    Lupus (HCC)     8 years    No known problems        Past Surgical History:   Procedure Laterality Date     SECTION       SECTION      NO PAST SURGERIES         Family History   Problem Relation Age of Onset    Breast cancer Neg Hx     Colon cancer Neg Hx          Medications have been verified.        Objective   /60   Pulse 101    "Temp 97.5 °F (36.4 °C)   Resp 17   Ht 4' 11\" (1.499 m)   Wt 55.3 kg (122 lb)   SpO2 99%   BMI 24.64 kg/m²        Physical Exam     Physical Exam  Vitals and nursing note reviewed.   Constitutional:       General: She is not in acute distress.     Appearance: Normal appearance. She is not ill-appearing or toxic-appearing.   HENT:      Right Ear: Tympanic membrane, ear canal and external ear normal.      Left Ear: Tympanic membrane, ear canal and external ear normal.      Nose: Nose normal.      Mouth/Throat:      Mouth: Mucous membranes are moist.      Pharynx: No oropharyngeal exudate or posterior oropharyngeal erythema.   Cardiovascular:      Rate and Rhythm: Normal rate and regular rhythm.   Pulmonary:      Effort: Pulmonary effort is normal.      Breath sounds: Normal breath sounds.   Neurological:      Mental Status: She is alert.                   "

## 2024-04-07 ENCOUNTER — TELEPHONE (OUTPATIENT)
Dept: OTHER | Facility: OTHER | Age: 29
End: 2024-04-07

## 2024-04-08 ENCOUNTER — OFFICE VISIT (OUTPATIENT)
Dept: FAMILY MEDICINE CLINIC | Facility: CLINIC | Age: 29
End: 2024-04-08
Payer: COMMERCIAL

## 2024-04-08 ENCOUNTER — TELEPHONE (OUTPATIENT)
Dept: FAMILY MEDICINE CLINIC | Facility: CLINIC | Age: 29
End: 2024-04-08

## 2024-04-08 VITALS
HEART RATE: 100 BPM | OXYGEN SATURATION: 98 % | DIASTOLIC BLOOD PRESSURE: 80 MMHG | SYSTOLIC BLOOD PRESSURE: 122 MMHG | HEIGHT: 59 IN | TEMPERATURE: 97.8 F | RESPIRATION RATE: 15 BRPM | WEIGHT: 119.9 LBS | BODY MASS INDEX: 24.17 KG/M2

## 2024-04-08 DIAGNOSIS — T78.40XD ALLERGY, SUBSEQUENT ENCOUNTER: ICD-10-CM

## 2024-04-08 DIAGNOSIS — D22.9 ATYPICAL MOLE: ICD-10-CM

## 2024-04-08 DIAGNOSIS — J45.30 MILD PERSISTENT ASTHMA WITHOUT COMPLICATION: ICD-10-CM

## 2024-04-08 DIAGNOSIS — M32.8 OTHER FORMS OF SYSTEMIC LUPUS ERYTHEMATOSUS, UNSPECIFIED ORGAN INVOLVEMENT STATUS (HCC): ICD-10-CM

## 2024-04-08 DIAGNOSIS — Z80.8 FAMILY HISTORY OF SKIN CANCER: ICD-10-CM

## 2024-04-08 DIAGNOSIS — Z00.00 ANNUAL PHYSICAL EXAM: Primary | ICD-10-CM

## 2024-04-08 PROBLEM — O99.891 MATERNAL RHEUMATOID ARTHRITIS COMPLICATING PREGNANCY (HCC): Status: RESOLVED | Noted: 2020-03-17 | Resolved: 2024-04-08

## 2024-04-08 PROBLEM — M06.9 MATERNAL RHEUMATOID ARTHRITIS COMPLICATING PREGNANCY (HCC): Status: ACTIVE | Noted: 2020-03-17

## 2024-04-08 PROBLEM — O99.891 MATERNAL RHEUMATOID ARTHRITIS COMPLICATING PREGNANCY (HCC): Status: ACTIVE | Noted: 2020-03-17

## 2024-04-08 PROBLEM — M06.9 MATERNAL RHEUMATOID ARTHRITIS COMPLICATING PREGNANCY (HCC): Status: RESOLVED | Noted: 2020-03-17 | Resolved: 2024-04-08

## 2024-04-08 PROCEDURE — 99203 OFFICE O/P NEW LOW 30 MIN: CPT | Performed by: NURSE PRACTITIONER

## 2024-04-08 PROCEDURE — 99385 PREV VISIT NEW AGE 18-39: CPT | Performed by: NURSE PRACTITIONER

## 2024-04-08 NOTE — TELEPHONE ENCOUNTER
Patient is requesting a call back to schedule New Patient appt. She is requesting appt sooner than 2 weeks out.

## 2024-04-08 NOTE — TELEPHONE ENCOUNTER
Patient called and asked where she can go to see a dermatologist sooner than [facility] in late November?  Sumi read in the visit from today with Sandra to go to [facility].

## 2024-04-08 NOTE — PROGRESS NOTES
ADULT ANNUAL PHYSICAL  [facility] PRACT[facility]    NAME: Maricarmen Garcia  AGE: 28 y.o. SEX: female  : 1995     DATE: 2024     Assessment and Plan:  Immunizations UTD.  Pt declining fasting labs.  PAP through GYN.  F/u in 1 year for annual physical or sooner PRN.     Problem List Items Addressed This Visit          Respiratory    Mild persistent asthma without complication    Managed w/ PRN Albuterol. Continue same.         Rheumatology    Other forms of systemic lupus erythematosus (HCC)    Relevant Orders        Ambulatory Referral to Rheumatology    Referral placed w/ Rheumatology to get established.     Other Visit Diagnoses       Annual physical exam    -  Primary    Atypical mole        Relevant Orders    Ambulatory Referral to Dermatology    Family history of skin cancer        Relevant Orders    Ambulatory Referral to Dermatology            Immunizations and preventive care screenings were discussed with patient today. Appropriate education was printed on patient's after visit summary.    Counseling:  Alcohol/drug use: discussed moderation in alcohol intake, the recommendations for healthy alcohol use, and avoidance of illicit drug use.  Dental Health: discussed importance of regular tooth brushing, flossing, and dental visits.  Injury prevention: discussed safety/seat belts, safety helmets, smoke detectors, carbon dioxide detectors, and smoking near bedding or upholstery.  Sexual health: discussed sexually transmitted diseases, partner selection, use of condoms, avoidance of unintended pregnancy, and contraceptive alternatives.  Exercise: the importance of regular exercise/physical activity was discussed. Recommend exercise 3-5 times per week for at least 30 minutes.       Depression Screening and Follow-up Plan: Patient was screened for depression during today's encounter. They screened negative with a PHQ-2 score of  0.        Return in about 1 year (around 4/8/2025) for Annual physical.     Chief Complaint:     Chief Complaint   Patient presents with    Establish Care      History of Present Illness:     Adult Annual Physical   Patient here for a comprehensive physical exam. She is a new patient to our practice in hopes to move locally from the [facility]. She has a 3 year old daughter and 1 year old son. She has pmhx of RA and lupus. Pt also has a spot on her sole of her right foot that she is worried about and would like to see a dermatologist.    Diet and Physical Activity  Diet/Nutrition: well balanced diet and limited fruits/vegetables.   Exercise: moderate cardiovascular exercise and 30-60 minutes on average.      Depression Screening  PHQ-2/9 Depression Screening    Little interest or pleasure in doing things: 0 - not at all  Feeling down, depressed, or hopeless: 0 - not at all  PHQ-2 Score: 0  PHQ-2 Interpretation: Negative depression screen       General Health  Sleep: sleeps well and gets 4-6 hours of sleep on average.   Hearing: normal - bilateral.  Vision: no vision problems, most recent eye exam <1 year ago, and wears glasses.   Dental: regular dental visits, brushes teeth twice daily, and flosses teeth occasionally.       /GYN Health  Follows with gynecology? yes   Last menstrual period: 2 months ago, does not take placebo pills  Contraceptive method: oral contraceptives.        Review of Systems:     Review of Systems   Constitutional: Negative.  Negative for chills and fatigue.   HENT: Negative.     Respiratory: Negative.  Negative for cough and shortness of breath.    Cardiovascular: Negative.  Negative for chest pain.   Gastrointestinal: Negative.    Genitourinary: Negative.    Musculoskeletal: Negative.  Negative for myalgias.   Neurological: Negative.       Past Medical History:     Past Medical History:   Diagnosis Date    Anemia     Arthritis     Asthma     Endometriosis 10/19/2023    Lupus (HCC)     8  years    No known problems       Past Surgical History:     Past Surgical History:   Procedure Laterality Date     SECTION       SECTION      NO PAST SURGERIES        Social History:     Social History     Socioeconomic History    Marital status: /Civil Union     Spouse name: None    Number of children: None    Years of education: None    Highest education level: None   Occupational History    None   Tobacco Use    Smoking status: Never    Smokeless tobacco: Never   Vaping Use    Vaping status: Never Used   Substance and Sexual Activity    Alcohol use: No    Drug use: No    Sexual activity: Yes     Partners: Male     Birth control/protection: OCP     Comment: April pill   Other Topics Concern    None   Social History Narrative    None     Social Determinants of Health     Financial Resource Strain: Low Risk  (10/18/2023)    Overall Financial Resource Strain (CARDIA)     Difficulty of Paying Living Expenses: Not very hard   Food Insecurity: Patient Declined (10/18/2023)    Hunger Vital Sign     Worried About Running Out of Food in the Last Year: Patient declined     Ran Out of Food in the Last Year: Patient declined   Transportation Needs: No Transportation Needs (10/18/2023)    PRAPARE - Transportation     Lack of Transportation (Medical): No     Lack of Transportation (Non-Medical): No   Physical Activity: Not on file   Stress: Not on file   Social Connections: Not on file   Intimate Partner Violence: Not on file   Housing Stability: Low Risk  (10/19/2023)    Housing Stability Vital Sign     Unable to Pay for Housing in the Last Year: No     Number of Places Lived in the Last Year: 1     Unstable Housing in the Last Year: No      Family History:     Family History   Problem Relation Age of Onset    Asthma Mother     Other Mother         gestational lupus    Hypertension Mother     Kidney disease Mother     Mental illness Maternal Grandmother     Bipolar disorder Maternal Grandmother   "   Alcohol abuse Paternal Grandfather     Breast cancer Neg Hx     Colon cancer Neg Hx     Substance Abuse Neg Hx       Current Medications:     Current Outpatient Medications   Medication Sig Dispense Refill    albuterol (PROVENTIL HFA,VENTOLIN HFA) 90 mcg/act inhaler Inhale 2 puffs every 4 (four) hours as needed for wheezing or shortness of breath 6.7 g 0    drospirenone-ethinyl estradiol (LUIS FERNANDO) 3-0.02 MG per tablet Take 1 tablet by mouth daily 84 tablet 3     No current facility-administered medications for this visit.      Allergies:     Allergies   Allergen Reactions    Amoxicillin-Pot Clavulanate GI Intolerance     Excessive vomiting    Benzocaine Swelling     Eardrum rupture with drops    Erythromycin     Erythromycin Base Nausea Only    Latex Hives    Lidocaine     Other      Other reaction(s): sneezing and watery eyes  Other reaction(s): sneezing and watery eyes      Physical Exam:     /80   Pulse 100   Temp 97.8 °F (36.6 °C)   Resp 15   Ht 4' 10.5\" (1.486 m)   Wt 54.4 kg (119 lb 14.4 oz)   SpO2 98%   BMI 24.63 kg/m²     Physical Exam  Vitals and nursing note reviewed.   Constitutional:       General: She is not in acute distress.     Appearance: Normal appearance. She is well-developed. She is not ill-appearing.   HENT:      Head: Normocephalic and atraumatic.      Right Ear: Tympanic membrane, ear canal and external ear normal.      Left Ear: Tympanic membrane, ear canal and external ear normal.   Eyes:      Conjunctiva/sclera: Conjunctivae normal.   Neck:      Vascular: No carotid bruit.   Cardiovascular:      Rate and Rhythm: Normal rate and regular rhythm.      Pulses: Normal pulses.      Heart sounds: Normal heart sounds. No murmur heard.  Pulmonary:      Effort: Pulmonary effort is normal. No respiratory distress.      Breath sounds: Normal breath sounds. No wheezing.   Abdominal:      General: There is no distension.      Palpations: Abdomen is soft. There is no mass.      Tenderness: " There is no abdominal tenderness. There is no guarding or rebound.      Hernia: No hernia is present.   Musculoskeletal:         General: Normal range of motion.      Cervical back: Normal range of motion and neck supple.      Right lower leg: No edema.      Left lower leg: No edema.   Skin:     General: Skin is warm and dry.      Capillary Refill: Capillary refill takes less than 2 seconds.      Comments: Small brown macule approx 0.2 cm x 0.2 cm to sole of right foot w/ darkened brown coloring in the center w/ lighter brown coloring circling around dark brown middle portion   Neurological:      Mental Status: She is alert and oriented to person, place, and time. Mental status is at baseline.      Motor: No weakness.      Gait: Gait normal.   Psychiatric:         Mood and Affect: Mood normal.         Behavior: Behavior normal.         Thought Content: Thought content normal.         Judgment: Judgment normal.          LARISSA Guerin   [facility]

## 2024-04-22 DIAGNOSIS — N94.6 DYSMENORRHEA: ICD-10-CM

## 2024-04-22 DIAGNOSIS — N92.1 BREAKTHROUGH BLEEDING ON OCPS: ICD-10-CM

## 2024-04-22 RX ORDER — DROSPIRENONE AND ETHINYL ESTRADIOL 0.02-3(28)
1 KIT ORAL DAILY
Qty: 84 TABLET | Refills: 1 | Status: SHIPPED | OUTPATIENT
Start: 2024-04-22 | End: 2024-04-25

## 2024-04-24 DIAGNOSIS — N92.1 BREAKTHROUGH BLEEDING ON OCPS: ICD-10-CM

## 2024-04-24 DIAGNOSIS — N94.6 DYSMENORRHEA: ICD-10-CM

## 2024-04-25 RX ORDER — DROSPIRENONE AND ETHINYL ESTRADIOL 0.02-3(28)
1 KIT ORAL DAILY
Qty: 84 TABLET | Refills: 1 | Status: SHIPPED | OUTPATIENT
Start: 2024-04-25

## 2024-04-26 ENCOUNTER — TELEPHONE (OUTPATIENT)
Age: 29
End: 2024-04-26

## 2024-07-01 ENCOUNTER — APPOINTMENT (OUTPATIENT)
Dept: LAB | Facility: CLINIC | Age: 29
End: 2024-07-01
Payer: COMMERCIAL

## 2024-07-01 ENCOUNTER — CONSULT (OUTPATIENT)
Dept: RHEUMATOLOGY | Facility: CLINIC | Age: 29
End: 2024-07-01
Payer: COMMERCIAL

## 2024-07-01 ENCOUNTER — OFFICE VISIT (OUTPATIENT)
Dept: GASTROENTEROLOGY | Facility: CLINIC | Age: 29
End: 2024-07-01
Payer: COMMERCIAL

## 2024-07-01 VITALS
RESPIRATION RATE: 18 BRPM | BODY MASS INDEX: 24.92 KG/M2 | HEIGHT: 59 IN | DIASTOLIC BLOOD PRESSURE: 82 MMHG | OXYGEN SATURATION: 99 % | WEIGHT: 123.6 LBS | TEMPERATURE: 97.6 F | HEART RATE: 89 BPM | SYSTOLIC BLOOD PRESSURE: 122 MMHG

## 2024-07-01 VITALS
BODY MASS INDEX: 24.88 KG/M2 | TEMPERATURE: 97.4 F | DIASTOLIC BLOOD PRESSURE: 70 MMHG | HEIGHT: 59 IN | OXYGEN SATURATION: 99 % | SYSTOLIC BLOOD PRESSURE: 120 MMHG | HEART RATE: 93 BPM | WEIGHT: 123.4 LBS

## 2024-07-01 DIAGNOSIS — M25.50 POLYARTHRALGIA: ICD-10-CM

## 2024-07-01 DIAGNOSIS — R10.84 GENERALIZED ABDOMINAL PAIN: ICD-10-CM

## 2024-07-01 DIAGNOSIS — M32.8 OTHER FORMS OF SYSTEMIC LUPUS ERYTHEMATOSUS, UNSPECIFIED ORGAN INVOLVEMENT STATUS (HCC): ICD-10-CM

## 2024-07-01 DIAGNOSIS — R10.11 RUQ PAIN: Primary | ICD-10-CM

## 2024-07-01 DIAGNOSIS — R11.0 NAUSEA: ICD-10-CM

## 2024-07-01 DIAGNOSIS — R10.9 ABDOMINAL CRAMPING: Primary | ICD-10-CM

## 2024-07-01 DIAGNOSIS — Z82.69 FAMILY HISTORY OF SYSTEMIC LUPUS ERYTHEMATOSUS (SLE) IN MOTHER: ICD-10-CM

## 2024-07-01 DIAGNOSIS — R21 FACIAL RASH: ICD-10-CM

## 2024-07-01 LAB — ANA SER QL IA: NEGATIVE

## 2024-07-01 PROCEDURE — 86038 ANTINUCLEAR ANTIBODIES: CPT | Performed by: PHYSICIAN ASSISTANT

## 2024-07-01 PROCEDURE — 99203 OFFICE O/P NEW LOW 30 MIN: CPT | Performed by: PHYSICIAN ASSISTANT

## 2024-07-01 PROCEDURE — 99244 OFF/OP CNSLTJ NEW/EST MOD 40: CPT | Performed by: PHYSICIAN ASSISTANT

## 2024-07-01 PROCEDURE — 36415 COLL VENOUS BLD VENIPUNCTURE: CPT | Performed by: PHYSICIAN ASSISTANT

## 2024-07-01 RX ORDER — ONDANSETRON 4 MG/1
4 TABLET, FILM COATED ORAL EVERY 8 HOURS PRN
Qty: 30 TABLET | Refills: 2 | Status: SHIPPED | OUTPATIENT
Start: 2024-07-01

## 2024-07-01 NOTE — H&P (VIEW-ONLY)
[facility]  Maricarmen Garcia 29 y.o. female MRN: [id number]       CC: Right upper quadrant discomfort    HPI: Maricarmen is a 29-year-old female with history of asthma who presents the office by referral of her PCP for new onset right upper quadrant discomfort.  Patient reports that she has had 2 pregnancies, she has a 3-year-old and a 1-year-old.  She had morning sickness and heartburn with her first child, a girl.  Patient reports that she did better with her second pregnancy which was with a boy.  Patient denies change in bowel habits from baseline or signs or GI bleeding.  She has nausea without vomiting.  She has not been on prednisone recently for her asthma.    She has never had an EGD or colonoscopy.    Review of Systems:    CONSTITUTIONAL: Denies any fever, chills, or rigors. Good appetite, and no recent weight loss.  HEENT: No earache or tinnitus. Denies hearing loss or visual disturbances.  CARDIOVASCULAR: No chest pain or palpitations.   RESPIRATORY: Denies any cough, hemoptysis, shortness of breath or dyspnea on exertion.  GASTROINTESTINAL: As noted in the History of Present Illness.   GENITOURINARY: No problems with urination. Denies any hematuria or dysuria.  NEUROLOGIC: No dizziness or vertigo, denies headaches.   MUSCULOSKELETAL: Denies any muscle or joint pain.   SKIN: Denies skin rashes or itching.   ENDOCRINE: Denies excessive thirst. Denies intolerance to heat or cold.  PSYCHOSOCIAL: Denies depression or anxiety. Denies any recent memory loss.       Current Outpatient Medications   Medication Sig Dispense Refill    albuterol (PROVENTIL HFA,VENTOLIN HFA) 90 mcg/act inhaler Inhale 2 puffs every 4 (four) hours as needed for wheezing or shortness of breath 6.7 g 0    drospirenone-ethinyl estradiol (Vestura) 3-0.02 MG per tablet Take 1 tablet by mouth daily HOLD THE 7 DAYS PLACEBO PILLS 84 tablet 1    mupirocin (BACTROBAN) 2 % ointment APPLY SMALL AMOUNT TOPICALLY DAILY.       No  current facility-administered medications for this visit.     Past Medical History:   Diagnosis Date    Anemia     Arthritis     Asthma     Endometriosis 10/19/2023    Lupus (HCC)     8 years    No known problems      Past Surgical History:   Procedure Laterality Date    ABDOMINAL SURGERY  ,     Csection     SECTION       SECTION      NO PAST SURGERIES      US GUIDED MASS BIOPSY (UNSPECIFIED) Right 2024    Foot biopsy     Social History     Socioeconomic History    Marital status: /Civil Union     Spouse name: None    Number of children: None    Years of education: None    Highest education level: None   Occupational History    None   Tobacco Use    Smoking status: Never    Smokeless tobacco: Never   Vaping Use    Vaping status: Never Used   Substance and Sexual Activity    Alcohol use: No    Drug use: No    Sexual activity: Yes     Partners: Male     Birth control/protection: OCP     Comment: April pill   Other Topics Concern    None   Social History Narrative    None     Social Determinants of Health     Financial Resource Strain: Low Risk  (10/18/2023)    Overall Financial Resource Strain (CARDIA)     Difficulty of Paying Living Expenses: Not very hard   Food Insecurity: Patient Declined (10/18/2023)    Hunger Vital Sign     Worried About Running Out of Food in the Last Year: Patient declined     Ran Out of Food in the Last Year: Patient declined   Transportation Needs: No Transportation Needs (10/18/2023)    PRAPARE - Transportation     Lack of Transportation (Medical): No     Lack of Transportation (Non-Medical): No   Physical Activity: Not on file   Stress: Not on file   Social Connections: Unknown (2024)    Received from [facility]    Social Connections     How often do you feel lonely or isolated from those around you? (Adult - for ages 18 years and over): Not on file   Intimate Partner Violence: Not on file   Housing Stability: Low Risk  (10/19/2023)     "Housing Stability Vital Sign     Unable to Pay for Housing in the Last Year: No     Number of Times Moved in the Last Year: 1     Homeless in the Last Year: No     Family History   Problem Relation Age of Onset    Asthma Mother     Other Mother         gestational lupus    Hypertension Mother     Kidney disease Mother     Miscarriages / Stillbirths Mother     Mental illness Maternal Grandmother     Bipolar disorder Maternal Grandmother     Alcohol abuse Paternal Grandfather     Breast cancer Neg Hx     Colon cancer Neg Hx     Substance Abuse Neg Hx             PHYSICAL EXAM:    Vitals:    07/01/24 1521   BP: 122/82   BP Location: Left arm   Patient Position: Sitting   Cuff Size: Standard   Pulse: 89   Resp: 18   Temp: 97.6 °F (36.4 °C)   TempSrc: Tympanic   SpO2: 99%   Weight: 56.1 kg (123 lb 9.6 oz)   Height: 4' 11\" (1.499 m)     General Appearance:   Alert and oriented x 3. Cooperative, and in no respiratory distress   HEENT:   Normocephalic, atraumatic, anicteric.     Neck:  Supple, symmetrical, trachea midline   Lungs:   Clear to auscultation bilaterally    Heart::   Regular rate and rhythm   Abdomen:   Soft, non-tender, non-distended; normal bowel sounds; no masses, no organomegaly    Genitalia:   Deferred    Rectal:   Deferred    Extremities:  No cyanosis, clubbing or edema    Pulses:  2+ and symmetric all extremities    Skin:  Skin color, texture, turgor normal, no rashes or lesions    Lymph nodes:  No palpable cervical or supraclavicular lymphadenopathy        Lab Results:   Results from last 6 Months   Lab Units 01/17/24  1653   WBC Thousand/uL 5.17   HEMOGLOBIN g/dL 13.2   HEMATOCRIT % 37.9   PLATELETS Thousands/uL 277   SEGS PCT % 88*   LYMPHO PCT % 5*   MONO PCT % 7   EOS PCT % 0     Results from last 6 Months   Lab Units 01/17/24  1653   POTASSIUM mmol/L 3.2*   CHLORIDE mmol/L 100   CO2 mmol/L 24   BUN mg/dL 8   CREATININE mg/dL 0.75   CALCIUM mg/dL 8.9   ALK PHOS U/L 42   ALT U/L 10   AST U/L 16       " "  Results from last 6 Months   Lab Units 01/17/24  1653   LIPASE u/L 8*       Imaging Studies:   US pelvis complete w transvaginal    Result Date: 2/4/2024  Impression: Nonspecific heterogeneous uterine echotexture, otherwise unremarkable exam. Workstation performed: [id number]       ASSESSMENT and PLAN:      1) Right upper quadrant discomfort, nausea - We will plan for abdominal ultrasound first to investigate given that she is at risk for gallstone formation given recent pregnancies, and where her abdominal discomfort is along with nausea.  If her testing is negative, we will plan for EGD to investigate.  She is agreeable to this plan.  - Right upper quadrant ultrasound  - Zofran sent to the pharmacy, patient confirmed that she is not breast-feeding  - Further recommendations based on above findings      Follow up after ultrasound.      Portions of the record may have been created with voice recognition software.  Occasional wrong word or \"sound a like\" substitutions may have occurred due to the inherent limitations of voice recognition software.  Read the chart carefully and recognize, using context, where substitutions have occurred.  "

## 2024-07-01 NOTE — PROGRESS NOTES
Assessment and Plan:  Ms. Garcia is a 29-year-old female with past medical history significant for mild persistent asthma and endometriosis who presents for rheumatology evaluation of other forms of systemic lupus erythematosus, unspecified organ involvement status.    Apparently, around the age of 14, the patient presented with bilateral knee swelling and the testing was inconclusive.  There was concern for lupus due to family history in her mother, but she remained asymptomatic.of note, the patient has never seen rheumatology in the past-it appears that there was an appointment scheduled with [facility] rheumatology in June 2023 which she did not attend.  Labs reviewed from May 2020 were unremarkable in terms of OLAF, SSA, SSB, C3, C4, and testing for APLS.     The patient reports facial redness, dry mouth, low-grade fevers in the evening around 101 °F over the last 2 years, and some sort of painful red eye event many ago therefore I have agreed to update OLAF to see if there has been any changes.  I am not overly convinced that her facial rash is a traditional malar rash.  We also discussed that it may be a possibility to obtain skin biopsy through dermatology if results are inconclusive and if there is concern for any sort of cutaneous lupus.    I will be in touch regarding the results of this blood test to see if rheumatology follow-up is needed.  Additionally, have placed consultation to gastroenterology in light of her ongoing abdominal pain and cramping.  Follow-up to be determined.    Plan:  Diagnoses and all orders for this visit:    Abdominal cramping  -     OLAF Screen w/ Reflex to Titer/Pattern  -     Ambulatory referral to Gastroenterology    Other forms of systemic lupus erythematosus, unspecified organ involvement status (HCC)  -     Ambulatory Referral to Rheumatology  -     OLAF Screen w/ Reflex to Titer/Pattern    Generalized abdominal pain  -     OLAF Screen w/ Reflex to Titer/Pattern  -     Ambulatory  referral to Gastroenterology    Family history of systemic lupus erythematosus (SLE) in mother  -     OLAF Screen w/ Reflex to Titer/Pattern    Facial rash  -     OLAF Screen w/ Reflex to Titer/Pattern    Polyarthralgia  -     OLAF Screen w/ Reflex to Titer/Pattern    Other orders  -     mupirocin (BACTROBAN) 2 % ointment; APPLY SMALL AMOUNT TOPICALLY DAILY.        I have personally reviewed prior notes, recent laboratory results, and pertinent films in PACS.     Activities as tolerated.   Exercise: try to maintain a low impact exercise regimen as much as possible. Walk for 30 minutes a day for at least 3 days a week.   Continue other medications as prescribed by PCP and other specialists.           Follow-up plan: To be determined        Chief Complaint  No chief complaint on file.    HINA Garcia is a 29 y.o.  female who presents as a Rheumatology consult referred by Sandra Ghosh, * for evaluation of lupus.    The patient reports that she had an episode of knee pain around age 14 and was seen by orthopedics.  She was never seen by a rheumatologist or diagnosed formally with lupus.  She states that her mother has lupus.    At this time, she mentions low-grade fevers above 101 °F over the last 2 years typically in the evenings, redness of the face which worsens in the sunshine (no rashes anywhere else), history of EBV, dry mouth, for styes in the past year, an episode of painful red itchy eye while in high school (unclear about diagnosis), recurrent bronchitis, episodes of weight fluctuations, GERD, abdominal pain, abdominal cramping, occasional arthralgias of the knees or elbows typically in the morning for a few minutes and not associated with any swelling or stiffness.    +raynauds    Denies dry eyes, dry mouth, psoriasis, photosensitivity, mouth/nose ulcers, swollen glands, pleuritic chest pain, vomiting, diarrhea, blood in stools, inflammatory bowel disease, blood clots.    Review of Systems  Review  of Systems  Constitutional: +weight change, fevers, fatigue.  ENT/Mouth: Negative for hearing changes, ear pain, nasal congestion, sinus pain, hoarseness, sore throat, rhinorrhea, swallowing difficulty.   Eyes: Negative for pain, redness, discharge, vision changes.   Cardiovascular: Negative for chest pain, SOB, palpitations.   Respiratory: + cough, sputum, wheezing (when experiencing exacerbation of asthma/bronchitis)  Gastrointestinal: +pain, cramping. Negative for nausea, vomiting, diarrhea, constipation   Genitourinary: Negative for dysuria, urinary frequency, hematuria.   Musculoskeletal: As per HPI.  Skin: +skin rash, color changes.   Neuro: Negative for weakness, numbness, tingling, loss of consciousness.   Psych: Negative for anxiety, depression.   Heme/Lymph: Negative for easy bruising, bleeding, lymphadenopathy.      Allergies  Allergies   Allergen Reactions    Amoxicillin-Pot Clavulanate GI Intolerance     Excessive vomiting    Benzocaine Swelling     Eardrum rupture with drops    Erythromycin     Erythromycin Base Nausea Only    Latex Hives    Other      Other reaction(s): sneezing and watery eyes  Other reaction(s): sneezing and watery eyes       Home Medications    Current Outpatient Medications:     mupirocin (BACTROBAN) 2 % ointment, APPLY SMALL AMOUNT TOPICALLY DAILY., Disp: , Rfl:     albuterol (PROVENTIL HFA,VENTOLIN HFA) 90 mcg/act inhaler, Inhale 2 puffs every 4 (four) hours as needed for wheezing or shortness of breath, Disp: 6.7 g, Rfl: 0    drospirenone-ethinyl estradiol (Vestura) 3-0.02 MG per tablet, Take 1 tablet by mouth daily HOLD THE 7 DAYS PLACEBO PILLS, Disp: 84 tablet, Rfl: 1    Past Medical History  Past Medical History:   Diagnosis Date    Anemia     Arthritis     Asthma     Endometriosis 10/19/2023    Lupus (HCC)     8 years    No known problems        Past Surgical History   Past Surgical History:   Procedure Laterality Date     SECTION       SECTION       "NO PAST SURGERIES      US GUIDED MASS BIOPSY (UNSPECIFIED) Right 06/28/2024    Foot biopsy       Family History    Family History   Problem Relation Age of Onset    Asthma Mother     Other Mother         gestational lupus    Hypertension Mother     Kidney disease Mother     Mental illness Maternal Grandmother     Bipolar disorder Maternal Grandmother     Alcohol abuse Paternal Grandfather     Breast cancer Neg Hx     Colon cancer Neg Hx     Substance Abuse Neg Hx        Social History  Social History     Substance and Sexual Activity   Alcohol Use No     Social History     Substance and Sexual Activity   Drug Use No     Social History     Tobacco Use   Smoking Status Never   Smokeless Tobacco Never       Objective:  Vitals:    07/01/24 1004   BP: 120/70   Pulse: 93   Temp: (!) 97.4 °F (36.3 °C)   SpO2: 99%   Weight: 56 kg (123 lb 6.4 oz)   Height: 4' 10.5\" (1.486 m)       Physical Exam  General: Well appearing, well nourished, in no acute distress. Oriented x 3, normal mood and affect.  Ambulating without difficulty.  Skin: Good turgor, no rash, unusual bruising or prominent lesions.  Hair: Normal texture and distribution.  Nails: Normal color, no deformities.  HEENT:  Head: Normocephalic, atraumatic.  Eyes: Conjunctiva clear, sclera non-icteric, EOM intact.  Nose: No external lesions, mucosa non-inflamed.  Mouth: Mucous membranes moist, no mucosal lesions.  Neck: Supple   Musculoskeletal:   No asymmetry or deformities noted of bilateral upper and lower extremities.  No pain or swelling of joints bilaterally to include: shoulder, elbow, wrist, MCP I-V, PIP I-V, knee, ankle   Neurologic: Alert and oriented. No focal neurological deficits appreciated.   Psychiatric: Normal mood and affect.       Reviewed labs and imaging.    Imaging:   No results found.     Labs:   Admission on 01/17/2024, Discharged on 01/17/2024   Component Date Value Ref Range Status    Color, UA 01/17/2024 Yellow   Final    Clarity, UA 01/17/2024 " Clear   Final    Specific Gravity, UA 01/17/2024 1.029  1.003 - 1.030 Final    pH, UA 01/17/2024 5.5  4.5, 5.0, 5.5, 6.0, 6.5, 7.0, 7.5, 8.0 Final    Leukocytes, UA 01/17/2024 Negative  Negative Final    Nitrite, UA 01/17/2024 Negative  Negative Final    Protein, UA 01/17/2024 30 (1+) (A)  Negative mg/dl Final    Glucose, UA 01/17/2024 Negative  Negative mg/dl Final    Ketones, UA 01/17/2024 40 (2+) (A)  Negative mg/dl Final    Urobilinogen, UA 01/17/2024 <2.0  <2.0 mg/dl mg/dl Final    Bilirubin, UA 01/17/2024 Negative  Negative Final    Occult Blood, UA 01/17/2024 Large (A)  Negative Final    EXT Preg Test, Ur 01/17/2024 Negative   Final    Control 01/17/2024 Valid   Final    SARS-CoV-2 01/17/2024 Negative  Negative Final    INFLUENZA A PCR 01/17/2024 Positive (A)  Negative Final    INFLUENZA B PCR 01/17/2024 Negative  Negative Final    RSV PCR 01/17/2024 Negative  Negative Final    WBC 01/17/2024 5.17  4.31 - 10.16 Thousand/uL Final    RBC 01/17/2024 4.10  3.81 - 5.12 Million/uL Final    Hemoglobin 01/17/2024 13.2  11.5 - 15.4 g/dL Final    Hematocrit 01/17/2024 37.9  34.8 - 46.1 % Final    MCV 01/17/2024 92  82 - 98 fL Final    MCH 01/17/2024 32.2  26.8 - 34.3 pg Final    MCHC 01/17/2024 34.8  31.4 - 37.4 g/dL Final    RDW 01/17/2024 12.1  11.6 - 15.1 % Final    MPV 01/17/2024 8.8 (L)  8.9 - 12.7 fL Final    Platelets 01/17/2024 277  149 - 390 Thousands/uL Final    nRBC 01/17/2024 0  /100 WBCs Final    Segmented % 01/17/2024 88 (H)  43 - 75 % Final    Immature Grans % 01/17/2024 0  0 - 2 % Final    Lymphocytes % 01/17/2024 5 (L)  14 - 44 % Final    Monocytes % 01/17/2024 7  4 - 12 % Final    Eosinophils Relative 01/17/2024 0  0 - 6 % Final    Basophils Relative 01/17/2024 0  0 - 1 % Final    Absolute Neutrophils 01/17/2024 4.52  1.85 - 7.62 Thousands/µL Final    Absolute Immature Grans 01/17/2024 0.02  0.00 - 0.20 Thousand/uL Final    Absolute Lymphocytes 01/17/2024 0.24 (L)  0.60 - 4.47 Thousands/µL Final     Absolute Monocytes 01/17/2024 0.37  0.17 - 1.22 Thousand/µL Final    Eosinophils Absolute 01/17/2024 0.02  0.00 - 0.61 Thousand/µL Final    Basophils Absolute 01/17/2024 0.00  0.00 - 0.10 Thousands/µL Final    Sodium 01/17/2024 134 (L)  135 - 147 mmol/L Final    Potassium 01/17/2024 3.2 (L)  3.5 - 5.3 mmol/L Final    Chloride 01/17/2024 100  96 - 108 mmol/L Final    CO2 01/17/2024 24  21 - 32 mmol/L Final    ANION GAP 01/17/2024 10  mmol/L Final    BUN 01/17/2024 8  5 - 25 mg/dL Final    Creatinine 01/17/2024 0.75  0.60 - 1.30 mg/dL Final    Standardized to IDMS reference method    Glucose 01/17/2024 105  65 - 140 mg/dL Final    If the patient is fasting, the ADA then defines impaired fasting glucose as > 100 mg/dL and diabetes as > or equal to 123 mg/dL.    Calcium 01/17/2024 8.9  8.4 - 10.2 mg/dL Final    AST 01/17/2024 16  13 - 39 U/L Final    ALT 01/17/2024 10  7 - 52 U/L Final    Specimen collection should occur prior to Sulfasalazine administration due to the potential for falsely depressed results.     Alkaline Phosphatase 01/17/2024 42  34 - 104 U/L Final    Total Protein 01/17/2024 7.1  6.4 - 8.4 g/dL Final    Albumin 01/17/2024 3.9  3.5 - 5.0 g/dL Final    Total Bilirubin 01/17/2024 0.44  0.20 - 1.00 mg/dL Final    Use of this assay is not recommended for patients undergoing treatment with eltrombopag due to the potential for falsely elevated results.  N-acetyl-p-benzoquinone imine (metabolite of Acetaminophen) will generate erroneously low results in samples for patients that have taken an overdose of Acetaminophen.    eGFR 01/17/2024 108  ml/min/1.73sq m Final    Lipase 01/17/2024 8 (L)  11 - 82 u/L Final    RBC, UA 01/17/2024 Innumerable (A)  None Seen, 1-2 /hpf Final    WBC, UA 01/17/2024 2-4 (A)  None Seen, 1-2 /hpf Final    Epithelial Cells 01/17/2024 Occasional  None Seen, Occasional /hpf Final    Bacteria, UA 01/17/2024 None Seen  None Seen, Occasional /hpf Final    MUCUS THREADS 01/17/2024  Occasional (A)  None Seen Final         Heron Reeves PA-C  Rheumatology

## 2024-07-01 NOTE — PROGRESS NOTES
[facility]  Maricarmen Garcia 29 y.o. female MRN: [id number]       CC: Right upper quadrant discomfort    HPI: Maricarmen is a 29-year-old female with history of asthma who presents the office by referral of her PCP for new onset right upper quadrant discomfort.  Patient reports that she has had 2 pregnancies, she has a 3-year-old and a 1-year-old.  She had morning sickness and heartburn with her first child, a girl.  Patient reports that she did better with her second pregnancy which was with a boy.  Patient denies change in bowel habits from baseline or signs or GI bleeding.  She has nausea without vomiting.  She has not been on prednisone recently for her asthma.    She has never had an EGD or colonoscopy.    Review of Systems:    CONSTITUTIONAL: Denies any fever, chills, or rigors. Good appetite, and no recent weight loss.  HEENT: No earache or tinnitus. Denies hearing loss or visual disturbances.  CARDIOVASCULAR: No chest pain or palpitations.   RESPIRATORY: Denies any cough, hemoptysis, shortness of breath or dyspnea on exertion.  GASTROINTESTINAL: As noted in the History of Present Illness.   GENITOURINARY: No problems with urination. Denies any hematuria or dysuria.  NEUROLOGIC: No dizziness or vertigo, denies headaches.   MUSCULOSKELETAL: Denies any muscle or joint pain.   SKIN: Denies skin rashes or itching.   ENDOCRINE: Denies excessive thirst. Denies intolerance to heat or cold.  PSYCHOSOCIAL: Denies depression or anxiety. Denies any recent memory loss.       Current Outpatient Medications   Medication Sig Dispense Refill    albuterol (PROVENTIL HFA,VENTOLIN HFA) 90 mcg/act inhaler Inhale 2 puffs every 4 (four) hours as needed for wheezing or shortness of breath 6.7 g 0    drospirenone-ethinyl estradiol (Vestura) 3-0.02 MG per tablet Take 1 tablet by mouth daily HOLD THE 7 DAYS PLACEBO PILLS 84 tablet 1    mupirocin (BACTROBAN) 2 % ointment APPLY SMALL AMOUNT TOPICALLY DAILY.       No  current facility-administered medications for this visit.     Past Medical History:   Diagnosis Date    Anemia     Arthritis     Asthma     Endometriosis 10/19/2023    Lupus (HCC)     8 years    No known problems      Past Surgical History:   Procedure Laterality Date    ABDOMINAL SURGERY  ,     Csection     SECTION       SECTION      NO PAST SURGERIES      US GUIDED MASS BIOPSY (UNSPECIFIED) Right 2024    Foot biopsy     Social History     Socioeconomic History    Marital status: /Civil Union     Spouse name: None    Number of children: None    Years of education: None    Highest education level: None   Occupational History    None   Tobacco Use    Smoking status: Never    Smokeless tobacco: Never   Vaping Use    Vaping status: Never Used   Substance and Sexual Activity    Alcohol use: No    Drug use: No    Sexual activity: Yes     Partners: Male     Birth control/protection: OCP     Comment: April pill   Other Topics Concern    None   Social History Narrative    None     Social Determinants of Health     Financial Resource Strain: Low Risk  (10/18/2023)    Overall Financial Resource Strain (CARDIA)     Difficulty of Paying Living Expenses: Not very hard   Food Insecurity: Patient Declined (10/18/2023)    Hunger Vital Sign     Worried About Running Out of Food in the Last Year: Patient declined     Ran Out of Food in the Last Year: Patient declined   Transportation Needs: No Transportation Needs (10/18/2023)    PRAPARE - Transportation     Lack of Transportation (Medical): No     Lack of Transportation (Non-Medical): No   Physical Activity: Not on file   Stress: Not on file   Social Connections: Unknown (2024)    Received from [facility]    Social Connections     How often do you feel lonely or isolated from those around you? (Adult - for ages 18 years and over): Not on file   Intimate Partner Violence: Not on file   Housing Stability: Low Risk  (10/19/2023)     "Housing Stability Vital Sign     Unable to Pay for Housing in the Last Year: No     Number of Times Moved in the Last Year: 1     Homeless in the Last Year: No     Family History   Problem Relation Age of Onset    Asthma Mother     Other Mother         gestational lupus    Hypertension Mother     Kidney disease Mother     Miscarriages / Stillbirths Mother     Mental illness Maternal Grandmother     Bipolar disorder Maternal Grandmother     Alcohol abuse Paternal Grandfather     Breast cancer Neg Hx     Colon cancer Neg Hx     Substance Abuse Neg Hx             PHYSICAL EXAM:    Vitals:    07/01/24 1521   BP: 122/82   BP Location: Left arm   Patient Position: Sitting   Cuff Size: Standard   Pulse: 89   Resp: 18   Temp: 97.6 °F (36.4 °C)   TempSrc: Tympanic   SpO2: 99%   Weight: 56.1 kg (123 lb 9.6 oz)   Height: 4' 11\" (1.499 m)     General Appearance:   Alert and oriented x 3. Cooperative, and in no respiratory distress   HEENT:   Normocephalic, atraumatic, anicteric.     Neck:  Supple, symmetrical, trachea midline   Lungs:   Clear to auscultation bilaterally    Heart::   Regular rate and rhythm   Abdomen:   Soft, non-tender, non-distended; normal bowel sounds; no masses, no organomegaly    Genitalia:   Deferred    Rectal:   Deferred    Extremities:  No cyanosis, clubbing or edema    Pulses:  2+ and symmetric all extremities    Skin:  Skin color, texture, turgor normal, no rashes or lesions    Lymph nodes:  No palpable cervical or supraclavicular lymphadenopathy        Lab Results:   Results from last 6 Months   Lab Units 01/17/24  1653   WBC Thousand/uL 5.17   HEMOGLOBIN g/dL 13.2   HEMATOCRIT % 37.9   PLATELETS Thousands/uL 277   SEGS PCT % 88*   LYMPHO PCT % 5*   MONO PCT % 7   EOS PCT % 0     Results from last 6 Months   Lab Units 01/17/24  1653   POTASSIUM mmol/L 3.2*   CHLORIDE mmol/L 100   CO2 mmol/L 24   BUN mg/dL 8   CREATININE mg/dL 0.75   CALCIUM mg/dL 8.9   ALK PHOS U/L 42   ALT U/L 10   AST U/L 16       " "  Results from last 6 Months   Lab Units 01/17/24  1653   LIPASE u/L 8*       Imaging Studies:   US pelvis complete w transvaginal    Result Date: 2/4/2024  Impression: Nonspecific heterogeneous uterine echotexture, otherwise unremarkable exam. Workstation performed: [id number]       ASSESSMENT and PLAN:      1) Right upper quadrant discomfort, nausea - We will plan for abdominal ultrasound first to investigate given that she is at risk for gallstone formation given recent pregnancies, and where her abdominal discomfort is along with nausea.  If her testing is negative, we will plan for EGD to investigate.  She is agreeable to this plan.  - Right upper quadrant ultrasound  - Zofran sent to the pharmacy, patient confirmed that she is not breast-feeding  - Further recommendations based on above findings      Follow up after ultrasound.      Portions of the record may have been created with voice recognition software.  Occasional wrong word or \"sound a like\" substitutions may have occurred due to the inherent limitations of voice recognition software.  Read the chart carefully and recognize, using context, where substitutions have occurred.  "

## 2024-07-02 ENCOUNTER — NURSE TRIAGE (OUTPATIENT)
Age: 29
End: 2024-07-02

## 2024-07-02 ENCOUNTER — TELEPHONE (OUTPATIENT)
Dept: GASTROENTEROLOGY | Facility: CLINIC | Age: 29
End: 2024-07-02

## 2024-07-02 ENCOUNTER — PREP FOR PROCEDURE (OUTPATIENT)
Dept: GASTROENTEROLOGY | Facility: CLINIC | Age: 29
End: 2024-07-02

## 2024-07-02 ENCOUNTER — HOSPITAL ENCOUNTER (OUTPATIENT)
Dept: ULTRASOUND IMAGING | Facility: HOSPITAL | Age: 29
Discharge: HOME | End: 2024-07-02
Payer: COMMERCIAL

## 2024-07-02 ENCOUNTER — NURSE TRIAGE (OUTPATIENT)
Dept: GASTROENTEROLOGY | Facility: CLINIC | Age: 29
End: 2024-07-02

## 2024-07-02 DIAGNOSIS — R10.11 RUQ PAIN: ICD-10-CM

## 2024-07-02 DIAGNOSIS — R10.11 RUQ PAIN: Primary | ICD-10-CM

## 2024-07-02 DIAGNOSIS — R11.0 NAUSEA: ICD-10-CM

## 2024-07-02 PROCEDURE — 76705 ECHO EXAM OF ABDOMEN: CPT

## 2024-07-02 RX ORDER — DICYCLOMINE HCL 20 MG
20 TABLET ORAL EVERY 6 HOURS
Qty: 45 TABLET | Refills: 2 | Status: SHIPPED | OUTPATIENT
Start: 2024-07-02

## 2024-07-02 NOTE — TELEPHONE ENCOUNTER
Spoke to pt and gave ultrasound results. Pt informed that she will call back to schedule the EGD directly.  Telephone number was provided.

## 2024-07-02 NOTE — TELEPHONE ENCOUNTER
Scheduled date of EGD(as of today): 7-  Physician performing EGD: Dr. Lopez   Location of EGD: [facility]   Instructions reviewed with patient by: 7-1-2024   Clearances: 7-1-2024

## 2024-07-02 NOTE — TELEPHONE ENCOUNTER
Please let patient know that her ultrasound does not show gallstones.  We will schedule endoscopy as we discussed during the office visit.  Please forward to Sadie after you speak with her.  Can be scheduled with any of her physicians next available

## 2024-07-02 NOTE — TELEPHONE ENCOUNTER
Raul Hernandes, please schedule EGD with any provider next available.  I also sent Bentyl to the pharmacy.  If her pain is severe, she should go to the emergency room.  Thank you.

## 2024-07-02 NOTE — TELEPHONE ENCOUNTER
Regarding: more pain  ----- Message from Reddy RINALDI sent at 7/2/2024 10:36 AM EDT -----  Pt calling with increased pain in the abd going across.

## 2024-07-02 NOTE — TELEPHONE ENCOUNTER
Regarding: symptoms  ----- Message from Peyton MONGE sent at 7/2/2024  3:34 PM EDT -----  Patient called in stating pt is in a lot of pain. Patient states she is having pain on her right side and when patient eats the patient is having sharp, stabbing pain underneath rib cage.

## 2024-07-02 NOTE — TELEPHONE ENCOUNTER
"Patient seen in office yesterday for RUQ pain, IRVING Deal, scheduled for 7/16/24 EGD at [facility].    CLERICAL:  Patient would like test scheduled sooner and is willing to drive to ANY   facility to have completed for ongoing symptoms. Please reach out to schedule.        Patient states she continues with ongoing pain, when in for ultrasound and palpated on area had nausea/vomited. Patient advised to take Zofran as needed and go to clear liquid diet for now. Please/review advise on any orders/treatment. Patient would like call back no later than tomorrow with plan.   She is aware to report to ED for severe pain but would like to avoid having to do that.    Answer Assessment - Initial Assessment Questions  1. LOCATION: \"Where does it hurt?\"       Same pain as at visit yesterday,    Protocols used: Abdominal Pain - Female-ADULT-[place]    "

## 2024-07-03 NOTE — TELEPHONE ENCOUNTER
Attempted to call patient  to offer this Friday with Dr dia for her EGD  Phone kept ringing  no voicemail

## 2024-07-05 ENCOUNTER — ANESTHESIA (OUTPATIENT)
Dept: GASTROENTEROLOGY | Facility: HOSPITAL | Age: 29
End: 2024-07-05

## 2024-07-05 ENCOUNTER — ANESTHESIA EVENT (OUTPATIENT)
Dept: GASTROENTEROLOGY | Facility: HOSPITAL | Age: 29
End: 2024-07-05

## 2024-07-05 ENCOUNTER — HOSPITAL ENCOUNTER (OUTPATIENT)
Dept: GASTROENTEROLOGY | Facility: HOSPITAL | Age: 29
Setting detail: OUTPATIENT SURGERY
End: 2024-07-05
Payer: COMMERCIAL

## 2024-07-05 VITALS
SYSTOLIC BLOOD PRESSURE: 100 MMHG | WEIGHT: 121.69 LBS | OXYGEN SATURATION: 100 % | DIASTOLIC BLOOD PRESSURE: 64 MMHG | HEART RATE: 66 BPM | BODY MASS INDEX: 24.53 KG/M2 | HEIGHT: 59 IN | RESPIRATION RATE: 20 BRPM | TEMPERATURE: 98.7 F

## 2024-07-05 DIAGNOSIS — R10.11 RUQ PAIN: ICD-10-CM

## 2024-07-05 DIAGNOSIS — R11.0 NAUSEA: ICD-10-CM

## 2024-07-05 LAB
EXT PREGNANCY TEST URINE: NEGATIVE
EXT. CONTROL: NORMAL

## 2024-07-05 PROCEDURE — 88305 TISSUE EXAM BY PATHOLOGIST: CPT | Performed by: PATHOLOGY

## 2024-07-05 PROCEDURE — 88342 IMHCHEM/IMCYTCHM 1ST ANTB: CPT | Performed by: PATHOLOGY

## 2024-07-05 PROCEDURE — 81025 URINE PREGNANCY TEST: CPT | Performed by: ANESTHESIOLOGY

## 2024-07-05 PROCEDURE — 88341 IMHCHEM/IMCYTCHM EA ADD ANTB: CPT | Performed by: PATHOLOGY

## 2024-07-05 PROCEDURE — 88313 SPECIAL STAINS GROUP 2: CPT | Performed by: PATHOLOGY

## 2024-07-05 PROCEDURE — 43239 EGD BIOPSY SINGLE/MULTIPLE: CPT | Performed by: INTERNAL MEDICINE

## 2024-07-05 RX ORDER — OMEPRAZOLE 40 MG/1
40 CAPSULE, DELAYED RELEASE ORAL DAILY
Qty: 30 CAPSULE | Refills: 1 | Status: SHIPPED | OUTPATIENT
Start: 2024-07-05 | End: 2024-09-03

## 2024-07-05 RX ORDER — PROPOFOL 10 MG/ML
INJECTION, EMULSION INTRAVENOUS AS NEEDED
Status: DISCONTINUED | OUTPATIENT
Start: 2024-07-05 | End: 2024-07-05

## 2024-07-05 RX ORDER — SODIUM CHLORIDE, SODIUM LACTATE, POTASSIUM CHLORIDE, CALCIUM CHLORIDE 600; 310; 30; 20 MG/100ML; MG/100ML; MG/100ML; MG/100ML
50 INJECTION, SOLUTION INTRAVENOUS CONTINUOUS
Status: DISPENSED | OUTPATIENT
Start: 2024-07-05

## 2024-07-05 RX ADMIN — PROPOFOL 80 MG: 10 INJECTION, EMULSION INTRAVENOUS at 11:16

## 2024-07-05 RX ADMIN — SODIUM CHLORIDE, SODIUM LACTATE, POTASSIUM CHLORIDE, AND CALCIUM CHLORIDE 50 ML/HR: .6; .31; .03; .02 INJECTION, SOLUTION INTRAVENOUS at 10:05

## 2024-07-05 RX ADMIN — PROPOFOL 150 MG: 10 INJECTION, EMULSION INTRAVENOUS at 11:15

## 2024-07-05 NOTE — ANESTHESIA PREPROCEDURE EVALUATION
Procedure:  EGD    Relevant Problems   PULMONARY   (+) Mild persistent asthma without complication (Seasonal triggers, no recent albuterol use)      Rheumatology   (+) Other forms of systemic lupus erythematosus (HCC)        Physical Exam    Airway    Mallampati score: I  TM Distance: >3 FB  Neck ROM: full     Dental   No notable dental hx     Cardiovascular      Pulmonary      Other Findings  post-pubertal.      Anesthesia Plan  ASA Score- 2     Anesthesia Type- IV sedation with anesthesia with ASA Monitors.         Additional Monitors:     Airway Plan:            Plan Factors-Exercise tolerance (METS): >4 METS.    Chart reviewed.   Existing labs reviewed. Patient summary reviewed.                  Induction- intravenous.    Postoperative Plan-         Informed Consent- Anesthetic plan and risks discussed with patient.  I personally reviewed this patient with the CRNA. Discussed and agreed on the Anesthesia Plan with the CRNA..

## 2024-07-05 NOTE — ANESTHESIA POSTPROCEDURE EVALUATION
Post-Op Assessment Note    CV Status:  Stable    Pain management: adequate       Mental Status:  Awake and sleepy   Hydration Status:  Euvolemic   PONV Controlled:  Controlled   Airway Patency:  Patent     Post Op Vitals Reviewed: Yes    No anethesia notable event occurred.                BP      Temp      Pulse     Resp      SpO2

## 2024-07-05 NOTE — INTERVAL H&P NOTE
H&P reviewed. After examining the patient I find no changes in the patients condition since the H&P had been written.    Vitals:    07/05/24 0957   BP: 149/68   Pulse: 91   Resp: 20   Temp: 97.7 °F (36.5 °C)   SpO2: 99%

## 2024-07-09 PROCEDURE — 88342 IMHCHEM/IMCYTCHM 1ST ANTB: CPT | Performed by: PATHOLOGY

## 2024-07-09 PROCEDURE — 88305 TISSUE EXAM BY PATHOLOGIST: CPT | Performed by: PATHOLOGY

## 2024-07-09 PROCEDURE — 88313 SPECIAL STAINS GROUP 2: CPT | Performed by: PATHOLOGY

## 2024-07-09 PROCEDURE — 88341 IMHCHEM/IMCYTCHM EA ADD ANTB: CPT | Performed by: PATHOLOGY

## 2024-07-27 DIAGNOSIS — R10.11 RUQ PAIN: ICD-10-CM

## 2024-07-28 RX ORDER — OMEPRAZOLE 40 MG/1
40 CAPSULE, DELAYED RELEASE ORAL DAILY
Qty: 100 CAPSULE | Refills: 1 | Status: SHIPPED | OUTPATIENT
Start: 2024-07-28 | End: 2025-02-13

## 2024-08-21 ENCOUNTER — OFFICE VISIT (OUTPATIENT)
Dept: GASTROENTEROLOGY | Facility: CLINIC | Age: 29
End: 2024-08-21
Payer: COMMERCIAL

## 2024-08-21 VITALS
HEART RATE: 75 BPM | HEIGHT: 59 IN | SYSTOLIC BLOOD PRESSURE: 118 MMHG | TEMPERATURE: 98.4 F | WEIGHT: 125 LBS | OXYGEN SATURATION: 99 % | DIASTOLIC BLOOD PRESSURE: 78 MMHG | BODY MASS INDEX: 25.2 KG/M2

## 2024-08-21 DIAGNOSIS — N92.1 BREAKTHROUGH BLEEDING ON OCPS: ICD-10-CM

## 2024-08-21 DIAGNOSIS — R68.81 EARLY SATIETY: ICD-10-CM

## 2024-08-21 DIAGNOSIS — R10.11 RUQ PAIN: Primary | ICD-10-CM

## 2024-08-21 DIAGNOSIS — R11.0 NAUSEA: ICD-10-CM

## 2024-08-21 DIAGNOSIS — Z83.3 FAMILY HISTORY OF DIABETES MELLITUS: ICD-10-CM

## 2024-08-21 DIAGNOSIS — N94.6 DYSMENORRHEA: ICD-10-CM

## 2024-08-21 PROCEDURE — 99213 OFFICE O/P EST LOW 20 MIN: CPT | Performed by: PHYSICIAN ASSISTANT

## 2024-08-21 RX ORDER — FAMOTIDINE 40 MG/1
40 TABLET, FILM COATED ORAL 2 TIMES DAILY PRN
Qty: 60 TABLET | Refills: 2 | Status: SHIPPED | OUTPATIENT
Start: 2024-08-21

## 2024-08-21 NOTE — PROGRESS NOTES
[facility]  Maricarmen Garcia 29 y.o. female MRN: [id number]       CC: Follow-up for abdominal pain and early satiety    HPI: Maricarmen is a 29-year-old female with history of asthma who presents the office today for follow-up.  She was initially seen in our office in early July for new onset right upper quadrant discomfort. Patient underwent ultrasound revealing no signs of cholelithiasis according to the report.  She then underwent EGD with Dr. Lopez, which was normal including biopsies in July.    Patient reports that she still has discomfort underneath her rib cage, but now has pain that radiates periumbilically towards the left side.  She denies change in bowel habits or signs of GI bleeding.  She reports associated nausea and early satiety.    Review of Systems:    CONSTITUTIONAL: Denies any fever, chills, or rigors. Good appetite, and no recent weight loss.  HEENT: No earache or tinnitus. Denies hearing loss or visual disturbances.  CARDIOVASCULAR: No chest pain or palpitations.   RESPIRATORY: Denies any cough, hemoptysis, shortness of breath or dyspnea on exertion.  GASTROINTESTINAL: As noted in the History of Present Illness.   GENITOURINARY: No problems with urination. Denies any hematuria or dysuria.  NEUROLOGIC: No dizziness or vertigo, denies headaches.   MUSCULOSKELETAL: Denies any muscle or joint pain.   SKIN: Denies skin rashes or itching.   ENDOCRINE: Denies excessive thirst. Denies intolerance to heat or cold.  PSYCHOSOCIAL: Denies depression or anxiety. Denies any recent memory loss.       Current Outpatient Medications   Medication Sig Dispense Refill    albuterol (PROVENTIL HFA,VENTOLIN HFA) 90 mcg/act inhaler Inhale 2 puffs every 4 (four) hours as needed for wheezing or shortness of breath 6.7 g 0    dicyclomine (BENTYL) 20 mg tablet Take 1 tablet (20 mg total) by mouth every 6 (six) hours 45 tablet 2    drospirenone-ethinyl estradiol (Vestura) 3-0.02 MG per tablet Take 1 tablet  by mouth daily HOLD THE 7 DAYS PLACEBO PILLS 84 tablet 1    mupirocin (BACTROBAN) 2 % ointment APPLY SMALL AMOUNT TOPICALLY DAILY.      omeprazole (PriLOSEC) 40 MG capsule TAKE 1 CAPSULE (40 MG TOTAL) BY MOUTH DAILY. 100 capsule 1    ondansetron (ZOFRAN) 4 mg tablet Take 1 tablet (4 mg total) by mouth every 8 (eight) hours as needed for nausea or vomiting 30 tablet 2     No current facility-administered medications for this visit.     Past Medical History:   Diagnosis Date    Anemia     Arthritis     Asthma     Endometriosis 10/19/2023    Lupus (HCC)     8 years    No known problems      Past Surgical History:   Procedure Laterality Date    ABDOMINAL SURGERY  ,     Csection     SECTION       SECTION      NO PAST SURGERIES      US GUIDED MASS BIOPSY (UNSPECIFIED) Right 2024    Foot biopsy     Social History     Socioeconomic History    Marital status: /Civil Union     Spouse name: Not on file    Number of children: Not on file    Years of education: Not on file    Highest education level: Not on file   Occupational History    Not on file   Tobacco Use    Smoking status: Never    Smokeless tobacco: Never   Vaping Use    Vaping status: Never Used   Substance and Sexual Activity    Alcohol use: No    Drug use: No    Sexual activity: Yes     Partners: Male     Birth control/protection: OCP     Comment: April pill   Other Topics Concern    Not on file   Social History Narrative    Not on file     Social Determinants of Health     Financial Resource Strain: Low Risk  (10/18/2023)    Overall Financial Resource Strain (CARDIA)     Difficulty of Paying Living Expenses: Not very hard   Food Insecurity: Patient Declined (10/18/2023)    Hunger Vital Sign     Worried About Running Out of Food in the Last Year: Patient declined     Ran Out of Food in the Last Year: Patient declined   Transportation Needs: No Transportation Needs (10/18/2023)    PRAPARE - Transportation     Lack of  "Transportation (Medical): No     Lack of Transportation (Non-Medical): No   Physical Activity: Not on file   Stress: Not on file   Social Connections: Unknown (6/18/2024)    Received from [employer]     How often do you feel lonely or isolated from those around you? (Adult - for ages 18 years and over): Not on file   Intimate Partner Violence: Not on file   Housing Stability: Low Risk  (10/19/2023)    Housing Stability Vital Sign     Unable to Pay for Housing in the Last Year: No     Number of Times Moved in the Last Year: 1     Homeless in the Last Year: No     Family History   Problem Relation Age of Onset    Asthma Mother     Other Mother         gestational lupus    Hypertension Mother     Kidney disease Mother     Miscarriages / Stillbirths Mother     Mental illness Maternal Grandmother     Bipolar disorder Maternal Grandmother     Alcohol abuse Paternal Grandfather     Breast cancer Neg Hx     Colon cancer Neg Hx     Substance Abuse Neg Hx             PHYSICAL EXAM:    There were no vitals filed for this visit.  General Appearance:   Alert and oriented x 3. Cooperative, and in no respiratory distress   HEENT:   Normocephalic, atraumatic, anicteric.     Neck:  Supple, symmetrical, trachea midline   Lungs:   Clear to auscultation bilaterally    Heart::   Regular rate and rhythm   Abdomen:   Soft, non-tender, non-distended; normal bowel sounds; no masses, no organomegaly    Genitalia:   Deferred    Rectal:   Deferred    Extremities:  No cyanosis, clubbing or edema    Pulses:  2+ and symmetric all extremities    Skin:  Skin color, texture, turgor normal, no rashes or lesions    Lymph nodes:  No palpable cervical or supraclavicular lymphadenopathy        Lab Results:             Invalid input(s): \"LABALBU\"            Imaging Studies:   EGD    Result Date: 7/5/2024  Impression: The esophagus, stomach and duodenum appeared normal. Performed forceps biopsies in the greater curve of the stomach, " "lesser curve of the stomach, incisura, antrum, 1st part of the duodenum and 2nd part of the duodenum to rule out celiac disease and H. pylori RECOMMENDATION: Await pathology results Start omeprazole 40 mg daily for 8 weeks.    Marina Lopez MD       ASSESSMENT and PLAN:      1) Abdominal discomfort, nausea, early satiety - Initial workup has been negative with endoscopy and ultrasound imaging.  Other differentials include biliary dyskinesia, gastroparesis, functional abdominal pain, etc.  - Plan for HIDA scan with CCK to rule out biliary dyskinesia  - Gastric emptying study to rule out gastroparesis  - Update labs  - Small, frequent meals; patient with a copy of a low residue diet  - Patient reports chest pain with omeprazole, will give her Pepcid 40 mg twice daily as needed  - Follow-up with PCP      Follow up after above testing.      Portions of the record may have been created with voice recognition software.  Occasional wrong word or \"sound a like\" substitutions may have occurred due to the inherent limitations of voice recognition software.  Read the chart carefully and recognize, using context, where substitutions have occurred.  "

## 2024-08-22 RX ORDER — DROSPIRENONE AND ETHINYL ESTRADIOL 0.02-3(28)
KIT ORAL
Qty: 84 TABLET | Refills: 1 | Status: SHIPPED | OUTPATIENT
Start: 2024-08-22

## 2024-08-24 ENCOUNTER — OFFICE VISIT (OUTPATIENT)
Dept: URGENT CARE | Facility: CLINIC | Age: 29
End: 2024-08-24
Payer: COMMERCIAL

## 2024-08-24 VITALS
DIASTOLIC BLOOD PRESSURE: 71 MMHG | OXYGEN SATURATION: 100 % | SYSTOLIC BLOOD PRESSURE: 118 MMHG | RESPIRATION RATE: 18 BRPM | TEMPERATURE: 97 F | HEART RATE: 73 BPM

## 2024-08-24 DIAGNOSIS — H10.13 ALLERGIC CONJUNCTIVITIS OF BOTH EYES: Primary | ICD-10-CM

## 2024-08-24 PROCEDURE — 99214 OFFICE O/P EST MOD 30 MIN: CPT | Performed by: PHYSICIAN ASSISTANT

## 2024-08-24 NOTE — PROGRESS NOTES
[facility]        NAME: Maricarmen Garcia is a 29 y.o. female  : 1995    MRN: [id number]  DATE: 2024  TIME: 10:06 AM    Assessment and Plan   Allergic conjunctivitis of both eyes [H10.13]  1. Allergic conjunctivitis of both eyes              Patient Instructions   Discussed with patient that this is likely allergy related.  Advised her to try Pataday.  We discussed signs of blepharitis such as irritated red swollen eyelash base which is not the case today.  If this occurs patient is to return for treatment.  If tests have been performed at [facility], our office will contact you with results if changes need to be made to the care plan discussed with you at the visit.  You can review your full results on [facility]  Follow up with PCP in 3-5 days.  Proceed to  ER if symptoms worsen.    Chief Complaint     Chief Complaint   Patient presents with    Eye Problem     Patient here with a little bit of a  burning sensation in both eyes and she woke up with them crusted this morning so she just wants to make sure its not pink eye.          History of Present Illness       HPI  This is a 29-year-old female here for an eye check.  She notes yesterday and today she woke up with some crusty's on her eyelashes.  She denies her eyes being crusted shut.  She denies any redness of her eye, change in vision, eye pain consistent Sativex to light, fever, vomiting, diarrhea, shortness breath or chest pain.  She does note slight itching in her eye.  She just wants to get checked prior to going to a 5-year-old birthday party and infecting other party goers   Review of Systems   Review of Systems   Constitutional:  Negative for fever.   Eyes:  Positive for itching. Negative for photophobia, pain, discharge, redness and visual disturbance.   Respiratory:  Negative for shortness of breath.    Cardiovascular:  Negative for chest pain.         Current Medications       Current Outpatient Medications:     albuterol  (PROVENTIL HFA,VENTOLIN HFA) 90 mcg/act inhaler, Inhale 2 puffs every 4 (four) hours as needed for wheezing or shortness of breath, Disp: 6.7 g, Rfl: 0    dicyclomine (BENTYL) 20 mg tablet, Take 1 tablet (20 mg total) by mouth every 6 (six) hours, Disp: 45 tablet, Rfl: 2    famotidine (PEPCID) 40 MG tablet, Take 1 tablet (40 mg total) by mouth 2 (two) times a day as needed for heartburn, Disp: 60 tablet, Rfl: 2    ondansetron (ZOFRAN) 4 mg tablet, Take 1 tablet (4 mg total) by mouth every 8 (eight) hours as needed for nausea or vomiting, Disp: 30 tablet, Rfl: 2    Vestura 3-0.02 MG per tablet, TAKE 1 TABLET BY MOUTH DAILY HOLD THE 7 DAYS PLACEBO PILLS, Disp: 84 tablet, Rfl: 1    mupirocin (BACTROBAN) 2 % ointment, APPLY SMALL AMOUNT TOPICALLY DAILY. (Patient not taking: Reported on 2024), Disp: , Rfl:     omeprazole (PriLOSEC) 40 MG capsule, TAKE 1 CAPSULE (40 MG TOTAL) BY MOUTH DAILY. (Patient not taking: Reported on 2024), Disp: 100 capsule, Rfl: 1    Current Allergies     Allergies as of 2024 - Reviewed 2024   Allergen Reaction Noted    Amoxicillin-pot clavulanate GI Intolerance 2022    Benzocaine Swelling 04/15/2020    Erythromycin  2016    Erythromycin base Nausea Only 2015    Latex Hives 2022    Other  2015            The following portions of the patient's history were reviewed and updated as appropriate: allergies, current medications, past family history, past medical history, past social history, past surgical history and problem list.     Past Medical History:   Diagnosis Date    Anemia     Arthritis     Asthma     Endometriosis 10/19/2023    Lupus (HCC)     8 years    No known problems        Past Surgical History:   Procedure Laterality Date    ABDOMINAL SURGERY  ,     Csection     SECTION       SECTION      NO PAST SURGERIES      US GUIDED MASS BIOPSY (UNSPECIFIED) Right 2024    Foot biopsy       Family History    Problem Relation Age of Onset    Asthma Mother     Other Mother         gestational lupus    Hypertension Mother     Kidney disease Mother     Miscarriages / Stillbirths Mother     Mental illness Maternal Grandmother     Bipolar disorder Maternal Grandmother     Alcohol abuse Paternal Grandfather     Breast cancer Neg Hx     Colon cancer Neg Hx     Substance Abuse Neg Hx          Medications have been verified.        Objective   /71   Pulse 73   Temp (!) 97 °F (36.1 °C)   Resp 18   SpO2 100%        Physical Exam     Physical Exam  Vitals and nursing note reviewed.   Constitutional:       General: She is not in acute distress.     Appearance: Normal appearance. She is normal weight. She is not ill-appearing or toxic-appearing.   Eyes:      General: Lids are normal. Lids are everted, no foreign bodies appreciated.         Right eye: No foreign body, discharge or hordeolum.         Left eye: No foreign body, discharge or hordeolum.      Conjunctiva/sclera:      Right eye: Right conjunctiva is not injected. No chemosis, exudate or hemorrhage.     Left eye: Left conjunctiva is not injected. No chemosis, exudate or hemorrhage.  Cardiovascular:      Rate and Rhythm: Normal rate and regular rhythm.   Pulmonary:      Effort: Pulmonary effort is normal.      Breath sounds: Normal breath sounds.   Neurological:      Mental Status: She is alert.

## 2024-08-27 ENCOUNTER — APPOINTMENT (OUTPATIENT)
Dept: LAB | Facility: CLINIC | Age: 29
End: 2024-08-27
Payer: COMMERCIAL

## 2024-08-27 DIAGNOSIS — R68.81 EARLY SATIETY: ICD-10-CM

## 2024-08-27 DIAGNOSIS — Z83.3 FAMILY HISTORY OF DIABETES MELLITUS: ICD-10-CM

## 2024-08-27 DIAGNOSIS — R11.0 NAUSEA: ICD-10-CM

## 2024-08-27 DIAGNOSIS — R10.11 RUQ PAIN: ICD-10-CM

## 2024-08-27 LAB
ERYTHROCYTE [DISTWIDTH] IN BLOOD BY AUTOMATED COUNT: 12.5 % (ref 11.6–15.1)
EST. AVERAGE GLUCOSE BLD GHB EST-MCNC: 103 MG/DL
HBA1C MFR BLD: 5.2 %
HCT VFR BLD AUTO: 41.5 % (ref 34.8–46.1)
HGB BLD-MCNC: 13.9 G/DL (ref 11.5–15.4)
MCH RBC QN AUTO: 32.4 PG (ref 26.8–34.3)
MCHC RBC AUTO-ENTMCNC: 33.5 G/DL (ref 31.4–37.4)
MCV RBC AUTO: 97 FL (ref 82–98)
PLATELET # BLD AUTO: 348 THOUSANDS/UL (ref 149–390)
PMV BLD AUTO: 9.6 FL (ref 8.9–12.7)
RBC # BLD AUTO: 4.29 MILLION/UL (ref 3.81–5.12)
TSH SERPL DL<=0.05 MIU/L-ACNC: 2.17 UIU/ML (ref 0.45–4.5)
WBC # BLD AUTO: 8.13 THOUSAND/UL (ref 4.31–10.16)

## 2024-08-27 PROCEDURE — 36415 COLL VENOUS BLD VENIPUNCTURE: CPT

## 2024-08-27 PROCEDURE — 85027 COMPLETE CBC AUTOMATED: CPT

## 2024-08-27 PROCEDURE — 86140 C-REACTIVE PROTEIN: CPT

## 2024-08-27 PROCEDURE — 80053 COMPREHEN METABOLIC PANEL: CPT

## 2024-08-27 PROCEDURE — 84443 ASSAY THYROID STIM HORMONE: CPT

## 2024-08-27 PROCEDURE — 83036 HEMOGLOBIN GLYCOSYLATED A1C: CPT

## 2024-08-28 ENCOUNTER — TELEPHONE (OUTPATIENT)
Dept: GASTROENTEROLOGY | Facility: CLINIC | Age: 29
End: 2024-08-28

## 2024-08-28 LAB
ALBUMIN SERPL BCG-MCNC: 4.1 G/DL (ref 3.5–5)
ALP SERPL-CCNC: 46 U/L (ref 34–104)
ALT SERPL W P-5'-P-CCNC: 13 U/L (ref 7–52)
ANION GAP SERPL CALCULATED.3IONS-SCNC: 10 MMOL/L (ref 4–13)
AST SERPL W P-5'-P-CCNC: 14 U/L (ref 13–39)
BILIRUB SERPL-MCNC: 0.6 MG/DL (ref 0.2–1)
BUN SERPL-MCNC: 13 MG/DL (ref 5–25)
CALCIUM SERPL-MCNC: 9.6 MG/DL (ref 8.4–10.2)
CHLORIDE SERPL-SCNC: 106 MMOL/L (ref 96–108)
CO2 SERPL-SCNC: 25 MMOL/L (ref 21–32)
CREAT SERPL-MCNC: 0.72 MG/DL (ref 0.6–1.3)
CRP SERPL QL: 2.4 MG/L
GFR SERPL CREATININE-BSD FRML MDRD: 113 ML/MIN/1.73SQ M
GLUCOSE P FAST SERPL-MCNC: 64 MG/DL (ref 65–99)
POTASSIUM SERPL-SCNC: 4.1 MMOL/L (ref 3.5–5.3)
PROT SERPL-MCNC: 7.2 G/DL (ref 6.4–8.4)
SODIUM SERPL-SCNC: 141 MMOL/L (ref 135–147)

## 2024-08-28 NOTE — TELEPHONE ENCOUNTER
----- Message from Tosha Deal PA-C sent at 8/28/2024  8:08 AM EDT -----  Please let patient know that her blood work was normal, thank you

## 2024-09-04 ENCOUNTER — TELEPHONE (OUTPATIENT)
Dept: GASTROENTEROLOGY | Facility: CLINIC | Age: 29
End: 2024-09-04

## 2024-09-04 NOTE — TELEPHONE ENCOUNTER
BASHIR form received on 07/12/22  to be completed by PCP  Copy made and placed in PCP folder  Forms to be delivered to PCP mailbox at assigned time      Order# [id number] Mailed order to remind patient to get    NM gastric emptying done

## 2024-09-12 DIAGNOSIS — R11.0 NAUSEA: ICD-10-CM

## 2024-09-12 DIAGNOSIS — R10.11 RUQ PAIN: ICD-10-CM

## 2024-09-12 DIAGNOSIS — R68.81 EARLY SATIETY: ICD-10-CM

## 2024-09-12 RX ORDER — FAMOTIDINE 40 MG/1
TABLET, FILM COATED ORAL
Qty: 180 TABLET | Refills: 1 | Status: SHIPPED | OUTPATIENT
Start: 2024-09-12

## 2024-09-16 DIAGNOSIS — N92.1 BREAKTHROUGH BLEEDING ON OCPS: ICD-10-CM

## 2024-09-16 DIAGNOSIS — N94.6 DYSMENORRHEA: ICD-10-CM

## 2024-09-16 RX ORDER — DROSPIRENONE AND ETHINYL ESTRADIOL 0.02-3(28)
KIT ORAL
Qty: 84 TABLET | Refills: 2 | Status: SHIPPED | OUTPATIENT
Start: 2024-09-16

## 2024-09-23 PROBLEM — H10.13 ALLERGIC CONJUNCTIVITIS OF BOTH EYES: Status: RESOLVED | Noted: 2024-08-24 | Resolved: 2024-09-23

## 2024-10-01 ENCOUNTER — OFFICE VISIT (OUTPATIENT)
Dept: URGENT CARE | Facility: CLINIC | Age: 29
End: 2024-10-01
Payer: COMMERCIAL

## 2024-10-01 VITALS
TEMPERATURE: 98.5 F | OXYGEN SATURATION: 98 % | DIASTOLIC BLOOD PRESSURE: 86 MMHG | RESPIRATION RATE: 18 BRPM | WEIGHT: 125 LBS | BODY MASS INDEX: 25.2 KG/M2 | SYSTOLIC BLOOD PRESSURE: 118 MMHG | HEART RATE: 91 BPM | HEIGHT: 59 IN

## 2024-10-01 DIAGNOSIS — H01.00A BLEPHARITIS OF UPPER AND LOWER EYELIDS OF BOTH EYES, UNSPECIFIED TYPE: Primary | ICD-10-CM

## 2024-10-01 DIAGNOSIS — H01.00B BLEPHARITIS OF UPPER AND LOWER EYELIDS OF BOTH EYES, UNSPECIFIED TYPE: Primary | ICD-10-CM

## 2024-10-01 PROCEDURE — 99212 OFFICE O/P EST SF 10 MIN: CPT | Performed by: PHYSICIAN ASSISTANT

## 2024-10-01 RX ORDER — ERYTHROMYCIN 5 MG/G
0.5 OINTMENT OPHTHALMIC
Qty: 3.5 G | Refills: 0 | Status: SHIPPED | OUTPATIENT
Start: 2024-10-01

## 2024-10-01 NOTE — PATIENT INSTRUCTIONS
Recommended treatment with antibiotic eye ointment this patient has not had success with several weeks of conservative treatment.  Patient only gets nausea with oral erythromycin so we will proceed with erythromycin ointment.  Patient can do warm compresses several times a day for symptomatic relief.  Advised to wash her eyes daily with baby shampoo to help with crusting of the lashes.  Advised to avoid touching and rubbing the eyes to prevent spread of bacteria.        Follow up with PCP in 3-5 days.  Proceed to  ER if symptoms worsen.    If tests are performed, our office will contact you with results only if changes need to made to the care plan discussed with you at the visit. You can review your full results on [facility].

## 2024-10-01 NOTE — PROGRESS NOTES
[facility]        NAME: Maricarmen Garcia is a 29 y.o. female  : 1995    MRN: [id number]  DATE: 2024  TIME: 7:42 PM    Assessment and Plan   Blepharitis of upper and lower eyelids of both eyes, unspecified type [H01.00A, H01.00B]  1. Blepharitis of upper and lower eyelids of both eyes, unspecified type  erythromycin (ILOTYCIN) ophthalmic ointment            Patient Instructions     Patient Instructions   Recommended treatment with antibiotic eye ointment this patient has not had success with several weeks of conservative treatment.  Patient only gets nausea with oral erythromycin so we will proceed with erythromycin ointment.  Patient can do warm compresses several times a day for symptomatic relief.  Advised to wash her eyes daily with baby shampoo to help with crusting of the lashes.  Advised to avoid touching and rubbing the eyes to prevent spread of bacteria.        Follow up with PCP in 3-5 days.  Proceed to  ER if symptoms worsen.    If tests are performed, our office will contact you with results only if changes need to made to the care plan discussed with you at the visit. [facility]        Chief Complaint     Chief Complaint   Patient presents with    Eye Pain     Pain in eyes, goopy on the outside, allergy drops didn't seem to help.         History of Present Illness       Patient presents for evaluation of continued eye pain and crusting after several weeks.  She states she was here few weeks ago and told it was just allergies and told to use over-the-counter eyedrops like Pataday.  She states it has not been helping her.  She has been noticing a lot more discharge from her eyes.        Review of Systems   Review of Systems   Eyes:  Positive for discharge, redness and itching. Negative for photophobia, pain and visual disturbance.   All other systems reviewed and are negative.        Current Medications       Current Outpatient  Medications:     albuterol (PROVENTIL HFA,VENTOLIN HFA) 90 mcg/act inhaler, Inhale 2 puffs every 4 (four) hours as needed for wheezing or shortness of breath, Disp: 6.7 g, Rfl: 0    dicyclomine (BENTYL) 20 mg tablet, Take 1 tablet (20 mg total) by mouth every 6 (six) hours, Disp: 45 tablet, Rfl: 2    erythromycin (ILOTYCIN) ophthalmic ointment, Administer 0.5 inches to both eyes daily at bedtime, Disp: 3.5 g, Rfl: 0    famotidine (PEPCID) 40 MG tablet, TAKE 1 TABLET (40 MG TOTAL) BY MOUTH TWICE A DAY AS NEEDED FOR HEARTBURN, Disp: 180 tablet, Rfl: 1    ondansetron (ZOFRAN) 4 mg tablet, Take 1 tablet (4 mg total) by mouth every 8 (eight) hours as needed for nausea or vomiting, Disp: 30 tablet, Rfl: 2    Vestura 3-0.02 MG per tablet, TAKE 1 TABLET BY MOUTH DAILY HOLD THE 7 DAYS PLACEBO PILLS, Disp: 84 tablet, Rfl: 2    mupirocin (BACTROBAN) 2 % ointment, APPLY SMALL AMOUNT TOPICALLY DAILY. (Patient not taking: Reported on 2024), Disp: , Rfl:     omeprazole (PriLOSEC) 40 MG capsule, TAKE 1 CAPSULE (40 MG TOTAL) BY MOUTH DAILY. (Patient not taking: Reported on 2024), Disp: 100 capsule, Rfl: 1    Current Allergies     Allergies as of 10/01/2024 - Reviewed 10/01/2024   Allergen Reaction Noted    Amoxicillin-pot clavulanate GI Intolerance 2022    Benzocaine Swelling 04/15/2020    Erythromycin  2016    Erythromycin base Nausea Only 2015    Latex Hives 2022    Other  2015            The following portions of the patient's history were reviewed and updated as appropriate: allergies, current medications, past family history, past medical history, past social history, past surgical history and problem list.     Past Medical History:   Diagnosis Date    Anemia     Arthritis     Asthma     Endometriosis 10/19/2023    Lupus     8 years    No known problems        Past Surgical History:   Procedure Laterality Date    ABDOMINAL SURGERY  2022    Csection     SECTION       " SECTION      NO PAST SURGERIES      US GUIDED MASS BIOPSY (UNSPECIFIED) Right 2024    Foot biopsy       Family History   Problem Relation Age of Onset    Asthma Mother     Other Mother         gestational lupus    Hypertension Mother     Kidney disease Mother     Miscarriages / Stillbirths Mother     Mental illness Maternal Grandmother     Bipolar disorder Maternal Grandmother     Alcohol abuse Paternal Grandfather     Breast cancer Neg Hx     Colon cancer Neg Hx     Substance Abuse Neg Hx          Medications have been verified.        Objective   /86   Pulse 91   Temp 98.5 °F (36.9 °C)   Resp 18   Ht 4' 11\" (1.499 m)   Wt 56.7 kg (125 lb)   SpO2 98%   BMI 25.25 kg/m²        Physical Exam     Physical Exam  Vitals and nursing note reviewed.   Constitutional:       Appearance: Normal appearance.   Eyes:      Extraocular Movements: Extraocular movements intact.      Right eye: Normal extraocular motion and no nystagmus.      Left eye: Normal extraocular motion and no nystagmus.      Conjunctiva/sclera:      Right eye: Right conjunctiva is not injected.      Left eye: Left conjunctiva is not injected.      Comments: Crusting of the upper and lower eyelids.  No obvious styes.  Very minimal amount of discharge he noticed-mainly crusting of the eyelashes.   Skin:     General: Skin is warm and dry.   Neurological:      General: No focal deficit present.      Mental Status: She is alert and oriented to person, place, and time.   Psychiatric:         Mood and Affect: Mood normal.         Behavior: Behavior normal.                   "

## 2024-10-12 ENCOUNTER — OFFICE VISIT (OUTPATIENT)
Dept: URGENT CARE | Facility: MEDICAL CENTER | Age: 29
End: 2024-10-12
Payer: COMMERCIAL

## 2024-10-12 VITALS
BODY MASS INDEX: 25.45 KG/M2 | TEMPERATURE: 98.2 F | HEART RATE: 85 BPM | WEIGHT: 126 LBS | RESPIRATION RATE: 18 BRPM | OXYGEN SATURATION: 99 %

## 2024-10-12 DIAGNOSIS — J20.9 ACUTE BRONCHITIS, UNSPECIFIED ORGANISM: Primary | ICD-10-CM

## 2024-10-12 PROCEDURE — 99213 OFFICE O/P EST LOW 20 MIN: CPT | Performed by: FAMILY MEDICINE

## 2024-10-12 RX ORDER — AZITHROMYCIN 250 MG/1
TABLET, FILM COATED ORAL
Qty: 6 TABLET | Refills: 0 | Status: SHIPPED | OUTPATIENT
Start: 2024-10-12 | End: 2024-10-16

## 2024-10-12 NOTE — PROGRESS NOTES
[facility]        NAME: Maricarmen Garcia is a 29 y.o. female  : 1995    MRN: [id number]  DATE: 2024  TIME: 7:32 PM    Assessment and Plan   Acute bronchitis, unspecified organism [J20.9]  1. Acute bronchitis, unspecified organism  azithromycin (ZITHROMAX) 250 mg tablet    due to underlying autoimmune disease, double sickening after covid and abn lung exam - given azithromycin.            Patient Instructions       Follow up with PCP in 3-5 days.  Proceed to  ER if symptoms worsen.    If tests have been performed at [facility], our office will contact you with results if changes need to be made to the care plan discussed with you at the visit.  [facility]    Chief Complaint     Chief Complaint   Patient presents with    Fever    Cough    Nasal Congestion     Patient recently had COVID a few weeks prior; over the last 2-3 days she has worsening cough, congestion, fevers          History of Present Illness       Had covid a few weeks ago and then started with a new fever today along with cough and congestion    Fever  This is a new problem. The current episode started yesterday. The problem occurs constantly. Associated symptoms include congestion, coughing and a fever. Pertinent negatives include no abdominal pain, anorexia, arthralgias, change in bowel habit, chest pain, chills, diaphoresis, fatigue, headaches, joint swelling, myalgias, nausea, neck pain, numbness, rash, sore throat, swollen glands, urinary symptoms, vertigo, visual change, vomiting or weakness.   Cough  Associated symptoms include a fever. Pertinent negatives include no chest pain, chills, headaches, myalgias, rash or sore throat.       Review of Systems   Review of Systems   Constitutional:  Positive for fever. Negative for chills, diaphoresis and fatigue.   HENT:  Positive for congestion. Negative for sore throat.    Respiratory:  Positive for cough.    Cardiovascular:  Negative  for chest pain.   Gastrointestinal:  Negative for abdominal pain, anorexia, change in bowel habit, nausea and vomiting.   Musculoskeletal:  Negative for arthralgias, joint swelling, myalgias and neck pain.   Skin:  Negative for rash.   Neurological:  Negative for vertigo, weakness, numbness and headaches.         Current Medications       Current Outpatient Medications:     azithromycin (ZITHROMAX) 250 mg tablet, Take 2 tablets today then 1 tablet daily x 4 days, Disp: 6 tablet, Rfl: 0    albuterol (PROVENTIL HFA,VENTOLIN HFA) 90 mcg/act inhaler, Inhale 2 puffs every 4 (four) hours as needed for wheezing or shortness of breath, Disp: 6.7 g, Rfl: 0    dicyclomine (BENTYL) 20 mg tablet, Take 1 tablet (20 mg total) by mouth every 6 (six) hours, Disp: 45 tablet, Rfl: 2    erythromycin (ILOTYCIN) ophthalmic ointment, Administer 0.5 inches to both eyes daily at bedtime, Disp: 3.5 g, Rfl: 0    famotidine (PEPCID) 40 MG tablet, TAKE 1 TABLET (40 MG TOTAL) BY MOUTH TWICE A DAY AS NEEDED FOR HEARTBURN, Disp: 180 tablet, Rfl: 1    mupirocin (BACTROBAN) 2 % ointment, APPLY SMALL AMOUNT TOPICALLY DAILY. (Patient not taking: Reported on 8/21/2024), Disp: , Rfl:     omeprazole (PriLOSEC) 40 MG capsule, TAKE 1 CAPSULE (40 MG TOTAL) BY MOUTH DAILY. (Patient not taking: Reported on 8/21/2024), Disp: 100 capsule, Rfl: 1    ondansetron (ZOFRAN) 4 mg tablet, Take 1 tablet (4 mg total) by mouth every 8 (eight) hours as needed for nausea or vomiting, Disp: 30 tablet, Rfl: 2    Vestura 3-0.02 MG per tablet, TAKE 1 TABLET BY MOUTH DAILY HOLD THE 7 DAYS PLACEBO PILLS, Disp: 84 tablet, Rfl: 2    Current Allergies     Allergies as of 10/12/2024 - Reviewed 10/12/2024   Allergen Reaction Noted    Amoxicillin-pot clavulanate GI Intolerance 01/26/2022    Benzocaine Swelling 04/15/2020    Erythromycin  09/16/2016    Erythromycin base Nausea Only 11/16/2015    Latex Hives 01/26/2022    Other  11/16/2015            The following portions of the  patient's history were reviewed and updated as appropriate: allergies, current medications, past family history, past medical history, past social history, past surgical history and problem list.     Past Medical History:   Diagnosis Date    Anemia     Arthritis     Asthma     Endometriosis 10/19/2023    Lupus     8 years    No known problems        Past Surgical History:   Procedure Laterality Date    ABDOMINAL SURGERY  ,     Csection     SECTION       SECTION      NO PAST SURGERIES      US GUIDED MASS BIOPSY (UNSPECIFIED) Right 2024    Foot biopsy       Family History   Problem Relation Age of Onset    Asthma Mother     Other Mother         gestational lupus    Hypertension Mother     Kidney disease Mother     Miscarriages / Stillbirths Mother     Mental illness Maternal Grandmother     Bipolar disorder Maternal Grandmother     Alcohol abuse Paternal Grandfather     Breast cancer Neg Hx     Colon cancer Neg Hx     Substance Abuse Neg Hx          Medications have been verified.        Objective   Pulse 85   Temp 98.2 °F (36.8 °C) (Temporal)   Resp 18   Wt 57.2 kg (126 lb)   SpO2 99%   BMI 25.45 kg/m²   No LMP recorded. (Menstrual status: Birth Control).       Physical Exam     Physical Exam  Vitals reviewed.   Constitutional:       General: She is not in acute distress.     Appearance: Normal appearance. She is not ill-appearing, toxic-appearing or diaphoretic.   HENT:      Head: Normocephalic and atraumatic.      Right Ear: Tympanic membrane normal.      Left Ear: Tympanic membrane normal.      Nose: Nose normal.      Mouth/Throat:      Mouth: Mucous membranes are moist.      Pharynx: No oropharyngeal exudate or posterior oropharyngeal erythema.   Eyes:      Pupils: Pupils are equal, round, and reactive to light.   Cardiovascular:      Rate and Rhythm: Normal rate and regular rhythm.      Heart sounds: No murmur heard.  Pulmonary:      Effort: Pulmonary effort is normal.  No respiratory distress.      Comments: Occasional rhonchi - clear with coughing - prolonged exp phase  Abdominal:      General: Abdomen is flat.      Palpations: Abdomen is soft.      Tenderness: There is no abdominal tenderness.   Musculoskeletal:         General: Normal range of motion.      Cervical back: Normal range of motion. No rigidity or tenderness.   Lymphadenopathy:      Cervical: No cervical adenopathy.   Skin:     General: Skin is warm and dry.      Capillary Refill: Capillary refill takes less than 2 seconds.   Neurological:      General: No focal deficit present.      Mental Status: She is alert and oriented to person, place, and time. Mental status is at baseline.   Psychiatric:         Mood and Affect: Mood normal.         Behavior: Behavior normal.         Thought Content: Thought content normal.

## 2024-10-14 ENCOUNTER — TELEPHONE (OUTPATIENT)
Age: 29
End: 2024-10-14

## 2024-10-14 ENCOUNTER — TELEPHONE (OUTPATIENT)
Dept: FAMILY MEDICINE CLINIC | Facility: CLINIC | Age: 29
End: 2024-10-14

## 2024-10-14 ENCOUNTER — OFFICE VISIT (OUTPATIENT)
Dept: FAMILY MEDICINE CLINIC | Facility: CLINIC | Age: 29
End: 2024-10-14
Payer: COMMERCIAL

## 2024-10-14 VITALS
HEART RATE: 116 BPM | WEIGHT: 123 LBS | TEMPERATURE: 98 F | SYSTOLIC BLOOD PRESSURE: 128 MMHG | RESPIRATION RATE: 20 BRPM | DIASTOLIC BLOOD PRESSURE: 80 MMHG | BODY MASS INDEX: 24.8 KG/M2 | OXYGEN SATURATION: 100 % | HEIGHT: 59 IN

## 2024-10-14 DIAGNOSIS — J45.21 MILD INTERMITTENT ASTHMA WITH EXACERBATION: Primary | ICD-10-CM

## 2024-10-14 DIAGNOSIS — J45.901 ASTHMA EXACERBATION: ICD-10-CM

## 2024-10-14 PROCEDURE — 99213 OFFICE O/P EST LOW 20 MIN: CPT | Performed by: NURSE PRACTITIONER

## 2024-10-14 RX ORDER — PREDNISONE 10 MG/1
TABLET ORAL
Qty: 20 TABLET | Refills: 0 | Status: SHIPPED | OUTPATIENT
Start: 2024-10-14 | End: 2024-10-14

## 2024-10-14 RX ORDER — ALBUTEROL SULFATE 90 UG/1
2 INHALANT RESPIRATORY (INHALATION) EVERY 4 HOURS PRN
Qty: 18 G | Refills: 3 | Status: SHIPPED | OUTPATIENT
Start: 2024-10-14

## 2024-10-14 RX ORDER — BUDESONIDE AND FORMOTEROL FUMARATE DIHYDRATE 80; 4.5 UG/1; UG/1
2 AEROSOL RESPIRATORY (INHALATION) 2 TIMES DAILY
Qty: 10.2 G | Refills: 2 | Status: SHIPPED | OUTPATIENT
Start: 2024-10-14

## 2024-10-14 RX ORDER — PREDNISONE 10 MG/1
TABLET ORAL
Qty: 20 TABLET | Refills: 0 | Status: SHIPPED | OUTPATIENT
Start: 2024-10-14

## 2024-10-14 RX ORDER — ALBUTEROL SULFATE 0.83 MG/ML
2.5 SOLUTION RESPIRATORY (INHALATION) EVERY 6 HOURS PRN
Qty: 180 ML | Refills: 5 | Status: SHIPPED | OUTPATIENT
Start: 2024-10-14

## 2024-10-14 NOTE — ASSESSMENT & PLAN NOTE
Orders:    albuterol (PROVENTIL HFA,VENTOLIN HFA) 90 mcg/act inhaler; Inhale 2 puffs every 4 (four) hours as needed for wheezing or shortness of breath    albuterol (2.5 mg/3 mL) 0.083 % nebulizer solution; Take 3 mL (2.5 mg total) by nebulization every 6 (six) hours as needed for wheezing or shortness of breath    Nebulizer Supplies    Nebulizer    budesonide-formoterol (Symbicort) 80-4.5 MCG/ACT inhaler; Inhale 2 puffs 2 (two) times a day Rinse mouth after use.    predniSONE 10 mg tablet; Take 4 tablets with food on days 1 & 2. Then take 3 tablets with food on days 3 &4. Then take 2 tablets with food on days 5 & 6. Then take 1 tablet with food on days 7 & 8.    Pt will continue her Zpak course fully. Albuterol nebulizer prescribed. Nebulizer machine and supplies also prescribed. Plan to have my staff fax orders over to [employer] to provide for patient. Symbicort prescribed as well for maintenance asthma therapy. Pt will take 2 puffs BID. She is encouraged to rinse out mouth after every use. If for any reason, her insurance is not able to provider her Symbicort in a timely fashion, or if the medical equipment company cannot get her the nebulizer within the next day, Prednisone prescribed to start if she is not able to get her Symbicort or Albuterol nebulizer. Patient is encouraged to call our office for any questions/concerns, persistent or worsening symptoms. Patient states they understand and agree with treatment plan.       Pt to f/u PRN.

## 2024-10-14 NOTE — TELEPHONE ENCOUNTER
Left message for pharmacy to call back and confirm what alternatives to symbicort may be covered without prior auth

## 2024-10-14 NOTE — PROGRESS NOTES
Ambulatory Visit  Name: Maricarmen Garcia      : 1995      MRN: [id number]  Encounter Provider: LARISSA Long  Encounter Date: 10/14/2024   Encounter department: [facility]    Assessment & Plan  Asthma exacerbation    Orders:    albuterol (PROVENTIL HFA,VENTOLIN HFA) 90 mcg/act inhaler; Inhale 2 puffs every 4 (four) hours as needed for wheezing or shortness of breath    albuterol (2.5 mg/3 mL) 0.083 % nebulizer solution; Take 3 mL (2.5 mg total) by nebulization every 6 (six) hours as needed for wheezing or shortness of breath    Nebulizer Supplies    Nebulizer    budesonide-formoterol (Symbicort) 80-4.5 MCG/ACT inhaler; Inhale 2 puffs 2 (two) times a day Rinse mouth after use.    predniSONE 10 mg tablet; Take 4 tablets with food on days 1 & 2. Then take 3 tablets with food on days 3 &4. Then take 2 tablets with food on days 5 & 6. Then take 1 tablet with food on days 7 & 8.    Pt will continue her Zpak course fully. Albuterol nebulizer prescribed. Nebulizer machine and supplies also prescribed. Plan to have my staff fax orders over to [employer] to provide for patient. Symbicort prescribed as well for maintenance asthma therapy. Pt will take 2 puffs BID. She is encouraged to rinse out mouth after every use. If for any reason, her insurance is not able to provider her Symbicort in a timely fashion, or if the medical equipment company cannot get her the nebulizer within the next day, Prednisone prescribed to start if she is not able to get her Symbicort or Albuterol nebulizer. Patient is encouraged to call our office for any questions/concerns, persistent or worsening symptoms. Patient states they understand and agree with treatment plan.       Pt to f/u PRN.      Depression Screening and Follow-up Plan: Patient was screened for depression during today's encounter. They screened negative with a PHQ-2 score of 0.      History of Present Illness  "    Pt presents today for dry cough, shortness of breath, wheezing over the last several weeks.  She was seen in Urgent care on 10/12 and was started on Zpak.  Pt does have hx of asthma and has been using her rescue albuterol inhaler.  She admits she does not feel like she is getting a lot of relief from her inhaler.  Pt admits she has been having to use her rescue inhaler daily over the last several weeks.        History obtained from : patient  Review of Systems  As noted per HPI.  Medical History Reviewed by provider this encounter:  Problems           Objective     /80   Pulse (!) 116   Temp 98 °F (36.7 °C)   Resp 20   Ht 4' 11\" (1.499 m)   Wt 55.8 kg (123 lb)   SpO2 100%   BMI 24.84 kg/m²     Physical Exam  Vitals reviewed.   Constitutional:       General: She is not in acute distress.     Appearance: She is well-developed.   HENT:      Right Ear: Tympanic membrane, ear canal and external ear normal.      Left Ear: Tympanic membrane, ear canal and external ear normal.      Nose: No congestion or rhinorrhea.      Mouth/Throat:      Pharynx: No oropharyngeal exudate or posterior oropharyngeal erythema.   Cardiovascular:      Rate and Rhythm: Tachycardia present.      Pulses: Normal pulses.      Heart sounds: Normal heart sounds.   Pulmonary:      Effort: No respiratory distress.      Breath sounds: No wheezing.      Comments: Pt does utilize accessory muscles during her inspiration/expiration  Lymphadenopathy:      Cervical: No cervical adenopathy.   Neurological:      Mental Status: She is alert and oriented to person, place, and time. Mental status is at baseline.   Psychiatric:         Mood and Affect: Mood normal.         Behavior: Behavior normal.         Thought Content: Thought content normal.         Judgment: Judgment normal.         "

## 2024-10-14 NOTE — TELEPHONE ENCOUNTER
----- Message from LARISSA Guerin sent at 10/14/2024  3:58 PM EDT -----  Can you let patient know that I will send over a Prednisone (steroid) taper for her to start while we await from the pharmacy which maintenance inhaler may be covered. She will take it tomorrow morning with food (all 4 pills at once). The dose will wean down over 8 days.

## 2024-10-14 NOTE — TELEPHONE ENCOUNTER
Patient called in regarding her script for symbicort that was ordered this am at appointment. Pharmacy told her it needs prior authorization. Unsure if we should start that or if something else should be ordered. Please advise.

## 2024-10-15 ENCOUNTER — PATIENT MESSAGE (OUTPATIENT)
Dept: FAMILY MEDICINE CLINIC | Facility: CLINIC | Age: 29
End: 2024-10-15

## 2024-10-15 NOTE — PATIENT COMMUNICATION
Pt called in stating that she just got off phone with [facility] pharmacy and they did not receive script for nebulizer that was placed yesterday. Pt asking if it can be re faxed to them again. Please review and adivse.

## 2024-10-16 ENCOUNTER — TELEPHONE (OUTPATIENT)
Age: 29
End: 2024-10-16

## 2024-10-16 NOTE — TELEPHONE ENCOUNTER
Sarah with [employer] called regarding nebulizer order. Will need additional information faxed to [phone].     Length of need added to order  Clinical notes to support need for nebulizer  Copy of current med list with nebulizer and medication listed

## 2024-10-16 NOTE — TELEPHONE ENCOUNTER
Maricarmen Izquierdo    Patient called and was told by [facility] that she needs a new Script for a Nebulizer & a Recent Chart note faxed over to them.    [facility]  Fax: [phone]

## 2024-10-16 NOTE — PATIENT COMMUNICATION
Pt called in stating that [facility] had just called her and stated they did not receive prescription for the nebulizer. Please re fax to new number provided  [phone]

## 2024-11-18 ENCOUNTER — OFFICE VISIT (OUTPATIENT)
Dept: URGENT CARE | Facility: CLINIC | Age: 29
End: 2024-11-18
Payer: COMMERCIAL

## 2024-11-18 VITALS
RESPIRATION RATE: 16 BRPM | SYSTOLIC BLOOD PRESSURE: 117 MMHG | TEMPERATURE: 97.3 F | OXYGEN SATURATION: 99 % | HEART RATE: 92 BPM | DIASTOLIC BLOOD PRESSURE: 77 MMHG

## 2024-11-18 DIAGNOSIS — J06.9 ACUTE URI: ICD-10-CM

## 2024-11-18 DIAGNOSIS — J45.31 MILD PERSISTENT ASTHMA WITH EXACERBATION: Primary | ICD-10-CM

## 2024-11-18 PROCEDURE — 99212 OFFICE O/P EST SF 10 MIN: CPT | Performed by: PHYSICIAN ASSISTANT

## 2024-11-18 RX ORDER — PREDNISONE 20 MG/1
TABLET ORAL
Qty: 18 TABLET | Refills: 0 | Status: SHIPPED | OUTPATIENT
Start: 2024-11-18 | End: 2024-11-27

## 2024-11-19 NOTE — PATIENT INSTRUCTIONS
Discussed with patient how she likely had a viral upper respiratory infection that caused a slight asthma exacerbation.  Recommended taper dose of oral steroid.  Continue with her inhalers daily.  Take over the counter medications as needed for symptomatic management.       Follow up with PCP in 3-5 days.  Proceed to  ER if symptoms worsen.    If tests are performed, our office will contact you with results only if changes need to made to the care plan discussed with you at the visit. You can review your full results on [facility].

## 2024-11-19 NOTE — PROGRESS NOTES
[facility]        NAME: Maricarmen Garcia is a 29 y.o. female  : 1995    MRN: [id number]  DATE: 2024  TIME: 7:54 PM    Assessment and Plan   Mild persistent asthma with exacerbation [J45.31]  1. Mild persistent asthma with exacerbation  predniSONE 20 mg tablet      2. Acute URI  predniSONE 20 mg tablet            Patient Instructions     Patient Instructions   Discussed with patient how she likely had a viral upper respiratory infection that caused a slight asthma exacerbation.  Recommended taper dose of oral steroid.  Continue with her inhalers daily.  Take over the counter medications as needed for symptomatic management.       Follow up with PCP in 3-5 days.  Proceed to  ER if symptoms worsen.    If tests are performed, our office will contact you with results only if changes need to made to the care plan discussed with you at the visit. You can review your full results on [facility].      Chief Complaint     Chief Complaint   Patient presents with    Cold Like Symptoms     Started 2 days ago, states problems breathing tonight, even using nebulizer, not helping         History of Present Illness       URI   This is a new problem. Episode onset: 2 days ago. The problem has been unchanged. There has been no fever. Associated symptoms include congestion, coughing and wheezing. Treatments tried: Nebulizer.   Shortness of Breath  The current episode started yesterday. The problem occurs 2 to 4 times per day. The problem has been waxing and waning since onset. Associated symptoms include chest pressure, coughing and wheezing. Pertinent negatives include no fatigue. Past treatments include beta-agonist inhalers. Her past medical history is significant for asthma.       Review of Systems   Review of Systems   Constitutional:  Negative for fatigue.   HENT:  Positive for congestion.    Respiratory:  Positive for cough, shortness of breath and wheezing.    All other systems reviewed and  are negative.        Current Medications       Current Outpatient Medications:     predniSONE 20 mg tablet, Take 3 tablets (60 mg total) by mouth daily for 3 days, THEN 2 tablets (40 mg total) daily for 3 days, THEN 1 tablet (20 mg total) daily for 3 days., Disp: 18 tablet, Rfl: 0    albuterol (2.5 mg/3 mL) 0.083 % nebulizer solution, Take 3 mL (2.5 mg total) by nebulization every 6 (six) hours as needed for wheezing or shortness of breath, Disp: 180 mL, Rfl: 5    albuterol (PROVENTIL HFA,VENTOLIN HFA) 90 mcg/act inhaler, Inhale 2 puffs every 4 (four) hours as needed for wheezing or shortness of breath, Disp: 18 g, Rfl: 3    budesonide-formoterol (Symbicort) 80-4.5 MCG/ACT inhaler, Inhale 2 puffs 2 (two) times a day Rinse mouth after use., Disp: 10.2 g, Rfl: 2    dicyclomine (BENTYL) 20 mg tablet, Take 1 tablet (20 mg total) by mouth every 6 (six) hours, Disp: 45 tablet, Rfl: 2    famotidine (PEPCID) 40 MG tablet, TAKE 1 TABLET (40 MG TOTAL) BY MOUTH TWICE A DAY AS NEEDED FOR HEARTBURN, Disp: 180 tablet, Rfl: 1    ondansetron (ZOFRAN) 4 mg tablet, Take 1 tablet (4 mg total) by mouth every 8 (eight) hours as needed for nausea or vomiting, Disp: 30 tablet, Rfl: 2    predniSONE 10 mg tablet, Take 4 tablets with food on days 1 & 2. Then take 3 tablets with food on days 3 &4. Then take 2 tablets with food on days 5 & 6. Then take 1 tablet with food on days 7 & 8., Disp: 20 tablet, Rfl: 0    Vestura 3-0.02 MG per tablet, TAKE 1 TABLET BY MOUTH DAILY HOLD THE 7 DAYS PLACEBO PILLS, Disp: 84 tablet, Rfl: 2    Current Allergies     Allergies as of 11/18/2024 - Reviewed 11/18/2024   Allergen Reaction Noted    Amoxicillin-pot clavulanate GI Intolerance 01/26/2022    Benzocaine Swelling 04/15/2020    Erythromycin  09/16/2016    Erythromycin base Nausea Only 11/16/2015    Latex Hives 01/26/2022    Other  11/16/2015            The following portions of the patient's history were reviewed and updated as appropriate: allergies,  current medications, past family history, past medical history, past social history, past surgical history and problem list.     Past Medical History:   Diagnosis Date    Anemia     Arthritis     Asthma     Endometriosis 10/19/2023    Lupus     8 years    No known problems        Past Surgical History:   Procedure Laterality Date    ABDOMINAL SURGERY  2022    Csection     SECTION       SECTION      NO PAST SURGERIES      US GUIDED MASS BIOPSY (UNSPECIFIED) Right 2024    Foot biopsy       Family History   Problem Relation Age of Onset    Asthma Mother     Other Mother         gestational lupus    Hypertension Mother     Kidney disease Mother     Miscarriages / Stillbirths Mother     Mental illness Maternal Grandmother     Bipolar disorder Maternal Grandmother     Alcohol abuse Paternal Grandfather     Breast cancer Neg Hx     Colon cancer Neg Hx     Substance Abuse Neg Hx          Medications have been verified.        Objective   /77   Pulse 92   Temp (!) 97.3 °F (36.3 °C)   Resp 16   SpO2 99%        Physical Exam     Physical Exam  Vitals and nursing note reviewed.   Constitutional:       Appearance: Normal appearance.   HENT:      Right Ear: Tympanic membrane, ear canal and external ear normal.      Left Ear: Tympanic membrane, ear canal and external ear normal.      Nose: Nose normal.      Mouth/Throat:      Mouth: Mucous membranes are moist.   Cardiovascular:      Rate and Rhythm: Normal rate and regular rhythm.   Pulmonary:      Effort: Pulmonary effort is normal. Prolonged expiration present. No respiratory distress.      Breath sounds: Normal breath sounds. Decreased air movement present. No wheezing.   Skin:     General: Skin is warm and dry.   Neurological:      General: No focal deficit present.      Mental Status: She is alert and oriented to person, place, and time.   Psychiatric:         Mood and Affect: Mood normal.         Behavior: Behavior normal.

## 2025-01-03 ENCOUNTER — OFFICE VISIT (OUTPATIENT)
Dept: URGENT CARE | Facility: MEDICAL CENTER | Age: 30
End: 2025-01-03
Payer: COMMERCIAL

## 2025-01-03 VITALS
HEART RATE: 110 BPM | BODY MASS INDEX: 26.08 KG/M2 | DIASTOLIC BLOOD PRESSURE: 74 MMHG | RESPIRATION RATE: 16 BRPM | OXYGEN SATURATION: 99 % | SYSTOLIC BLOOD PRESSURE: 116 MMHG | TEMPERATURE: 98 F | WEIGHT: 129.13 LBS

## 2025-01-03 DIAGNOSIS — K08.89 PAIN, DENTAL: Primary | ICD-10-CM

## 2025-01-03 PROCEDURE — 99213 OFFICE O/P EST LOW 20 MIN: CPT

## 2025-01-03 RX ORDER — IBUPROFEN 600 MG/1
600 TABLET, FILM COATED ORAL EVERY 6 HOURS PRN
Qty: 30 TABLET | Refills: 0 | Status: SHIPPED | OUTPATIENT
Start: 2025-01-03

## 2025-01-03 NOTE — PATIENT INSTRUCTIONS
600 mg of Motrin every 6 hours as needed for dental pain.  Referral to [facility] for further evaluation and possible extraction of wisdom tooth.  Follow up with PCP in 3-5 days.  Proceed to  ER if symptoms worsen.    If tests are performed, our office will contact you with results only if changes need to made to the care plan discussed with you at the visit. You can review your full results on [facility].

## 2025-01-03 NOTE — PROGRESS NOTES
[facility]        NAME: Maricarmen Garcia is a 29 y.o. female  : 1995    MRN: [id number]  DATE: January 3, 2025  TIME: 6:20 PM    Assessment and Plan   Pain, dental [K08.89]  1. Pain, dental  ibuprofen (MOTRIN) 600 mg tablet    Ambulatory Referral to Oral Maxillofacial Surgery            Patient Instructions     600 mg of Motrin every 6 hours as needed for dental pain.  Referral to OMFS for further evaluation and possible extraction of wisdom tooth.  Follow up with PCP in 3-5 days.  Proceed to  ER if symptoms worsen.    If tests are performed, our office will contact you with results only if changes need to made to the care plan discussed with you at the visit. [facility]    Chief Complaint     Chief Complaint   Patient presents with    Facial Pain     Pain in teeth on right side. Swelling on right side of face. Some ear pain. No cold sx. No fever or chills. Children at home have flu. Sx for 2 days.          History of Present Illness       29-year-old female presents for evaluation of dental pain.  Patient states over the past 2 days she has been experiencing dental pain associated with right-sided ear pain and right-sided facial swelling.  She denies any fevers or chills.  She denies any alleviating factors of her symptoms.        Review of Systems   Review of Systems   Constitutional:  Negative for chills and fever.   HENT:  Positive for dental problem, ear pain and facial swelling.    Respiratory:  Negative for cough and shortness of breath.    Cardiovascular:  Negative for chest pain.   Gastrointestinal:  Negative for diarrhea, nausea and vomiting.   All other systems reviewed and are negative.        Current Medications       Current Outpatient Medications:     albuterol (2.5 mg/3 mL) 0.083 % nebulizer solution, Take 3 mL (2.5 mg total) by nebulization every 6 (six) hours as needed for wheezing or shortness of breath, Disp: 180 mL, Rfl: 5    albuterol  (PROVENTIL HFA,VENTOLIN HFA) 90 mcg/act inhaler, Inhale 2 puffs every 4 (four) hours as needed for wheezing or shortness of breath, Disp: 18 g, Rfl: 3    budesonide-formoterol (Symbicort) 80-4.5 MCG/ACT inhaler, Inhale 2 puffs 2 (two) times a day Rinse mouth after use., Disp: 10.2 g, Rfl: 2    famotidine (PEPCID) 40 MG tablet, TAKE 1 TABLET (40 MG TOTAL) BY MOUTH TWICE A DAY AS NEEDED FOR HEARTBURN, Disp: 180 tablet, Rfl: 1    ibuprofen (MOTRIN) 600 mg tablet, Take 1 tablet (600 mg total) by mouth every 6 (six) hours as needed for mild pain or moderate pain, Disp: 30 tablet, Rfl: 0    Vestura 3-0.02 MG per tablet, TAKE 1 TABLET BY MOUTH DAILY HOLD THE 7 DAYS PLACEBO PILLS, Disp: 84 tablet, Rfl: 2    dicyclomine (BENTYL) 20 mg tablet, Take 1 tablet (20 mg total) by mouth every 6 (six) hours (Patient not taking: Reported on 1/3/2025), Disp: 45 tablet, Rfl: 2    ondansetron (ZOFRAN) 4 mg tablet, Take 1 tablet (4 mg total) by mouth every 8 (eight) hours as needed for nausea or vomiting (Patient not taking: Reported on 1/3/2025), Disp: 30 tablet, Rfl: 2    predniSONE 10 mg tablet, Take 4 tablets with food on days 1 & 2. Then take 3 tablets with food on days 3 &4. Then take 2 tablets with food on days 5 & 6. Then take 1 tablet with food on days 7 & 8. (Patient not taking: Reported on 1/3/2025), Disp: 20 tablet, Rfl: 0    Current Allergies     Allergies as of 01/03/2025 - Reviewed 01/03/2025   Allergen Reaction Noted    Amoxicillin-pot clavulanate GI Intolerance 01/26/2022    Benzocaine Swelling 04/15/2020    Erythromycin  09/16/2016    Erythromycin base Nausea Only 11/16/2015    Latex Hives 01/26/2022    Other  11/16/2015            The following portions of the patient's history were reviewed and updated as appropriate: allergies, current medications, past family history, past medical history, past social history, past surgical history and problem list.     Past Medical History:   Diagnosis Date    Anemia     Arthritis      Asthma     Endometriosis 10/19/2023    Lupus     8 years    No known problems        Past Surgical History:   Procedure Laterality Date    ABDOMINAL SURGERY  2022    Csection     SECTION       SECTION      NO PAST SURGERIES      US GUIDED MASS BIOPSY (UNSPECIFIED) Right 2024    Foot biopsy       Family History   Problem Relation Age of Onset    Asthma Mother     Other Mother         gestational lupus    Hypertension Mother     Kidney disease Mother     Miscarriages / Stillbirths Mother     Mental illness Maternal Grandmother     Bipolar disorder Maternal Grandmother     Alcohol abuse Paternal Grandfather     Breast cancer Neg Hx     Colon cancer Neg Hx     Substance Abuse Neg Hx          Medications have been verified.        Objective   /74   Pulse (!) 110   Temp 98 °F (36.7 °C)   Resp 16   Wt 58.6 kg (129 lb 2 oz)   SpO2 99%   BMI 26.08 kg/m²        Physical Exam     Physical Exam  Vitals and nursing note reviewed.   Constitutional:       General: She is not in acute distress.     Appearance: Normal appearance. She is normal weight. She is not ill-appearing, toxic-appearing or diaphoretic.   HENT:      Head: Normocephalic and atraumatic.      Mouth/Throat:      Mouth: Mucous membranes are moist.      Dentition: Dental tenderness present.      Pharynx: Oropharynx is clear.     Eyes:      General:         Right eye: No discharge.         Left eye: No discharge.   Cardiovascular:      Rate and Rhythm: Normal rate.      Pulses: Normal pulses.   Pulmonary:      Effort: Pulmonary effort is normal.   Skin:     General: Skin is warm and dry.   Neurological:      Mental Status: She is alert.   Psychiatric:         Mood and Affect: Mood normal.         Behavior: Behavior normal.

## 2025-01-15 ENCOUNTER — PATIENT MESSAGE (OUTPATIENT)
Dept: OBGYN CLINIC | Facility: CLINIC | Age: 30
End: 2025-01-15

## 2025-01-26 DIAGNOSIS — R11.0 NAUSEA: ICD-10-CM

## 2025-01-26 DIAGNOSIS — R68.81 EARLY SATIETY: ICD-10-CM

## 2025-01-26 DIAGNOSIS — R10.11 RUQ PAIN: ICD-10-CM

## 2025-01-27 RX ORDER — FAMOTIDINE 40 MG/1
TABLET, FILM COATED ORAL
Qty: 180 TABLET | Refills: 1 | Status: SHIPPED | OUTPATIENT
Start: 2025-01-27

## 2025-01-29 ENCOUNTER — OFFICE VISIT (OUTPATIENT)
Dept: OBGYN CLINIC | Facility: CLINIC | Age: 30
End: 2025-01-29

## 2025-01-29 VITALS
BODY MASS INDEX: 25.64 KG/M2 | DIASTOLIC BLOOD PRESSURE: 76 MMHG | SYSTOLIC BLOOD PRESSURE: 113 MMHG | HEIGHT: 59 IN | WEIGHT: 127.2 LBS

## 2025-01-29 DIAGNOSIS — N93.9 EPISODE OF HEAVY VAGINAL BLEEDING: Primary | ICD-10-CM

## 2025-01-29 DIAGNOSIS — L65.9 HAIR LOSS: ICD-10-CM

## 2025-01-29 PROCEDURE — 99214 OFFICE O/P EST MOD 30 MIN: CPT | Performed by: OBSTETRICS & GYNECOLOGY

## 2025-01-29 NOTE — ASSESSMENT & PLAN NOTE
Unexplained hair loss in clumps. Will assess testosterone level and TSH.   Orders:    Testosterone, free, total; Future

## 2025-01-29 NOTE — PROGRESS NOTES
OB/GYN VISIT  Maricarmen Garcia  2025  2:35 PM    ASSESSMENT / PLAN:    Maricarmen Garcia is a 29 y.o.  female presenting for heavy bleeding on OCPs as well as other symptoms including cramping and hair loss. TVUS  wnl. Bleeding improved for approximately 9mo prior to returning. No cervical polyps or lesions identified on exam. Will workup for bleeding disorder vs hormonal vs pelvic etiology.     Assessment & Plan  Episode of heavy vaginal bleeding  Will assess for bleeding disorder with PT, PTT, and CBC  Will assess pelvic anatomy for sources of bleeding such as polyp or fibroid  Will continue with Vestura while waiting for results  Return to office in 2 months to review results and reassess. Open to adjusting contraception if needed.   Orders:    TSH, 3rd generation with Free T4 reflex; Future    Protime-INR; Future    APTT; Future    CBC and differential; Future    US pelvis complete w transvaginal; Future    Hair loss  Unexplained hair loss in clumps. Will assess testosterone level and TSH.   Orders:    Testosterone, free, total; Future      SUBJECTIVE:    Maricarmen Garcia is a 29 y.o.  female presenting for heavy bleeding that started 3 months ago. Switched to Vestura in 2024 which was working well for her vaginal bleeding up until 3 months ago when she started to have increased bleeding and cramping. She is skipping the placebo week and had not had menses until around November. The bleeding is heavy, passing clots. Does not miss OCP dose and takes at 10pm daily. During bleeding, experiences cramping, fever, chills, nausea and vomiting. Around 3 months ago, she also started to notice hair loss in clumps when running her fingers through her hair. She is concerned about ovarian cancer because her grandmother (maternal) was diagnosed with ovarian cancer at the age of 24. Denies family history of bleeding disorder or thyroid issues. She has noticed increased bruising, but has attributed that  "to her two toddlers jumping on her. Denies hx of nose bleeds or gums bleeding. Menses have not always been heavy.     Past Medical History:   Diagnosis Date    Anemia     Arthritis     Asthma     Endometriosis 10/19/2023    Lupus     8 years    No known problems        Past Surgical History:   Procedure Laterality Date    ABDOMINAL SURGERY  ,     Csection     SECTION       SECTION      NO PAST SURGERIES      US GUIDED MASS BIOPSY (UNSPECIFIED) Right 2024    Foot biopsy    WISDOM TOOTH EXTRACTION         OBJECTIVE:    Vitals:  Blood pressure 113/76, height 4' 11\" (1.499 m), weight 57.7 kg (127 lb 3.2 oz), last menstrual period 01/10/2025.Body mass index is 25.69 kg/m².    Physical Exam:    Physical Exam  Constitutional:       General: She is not in acute distress.     Appearance: Normal appearance.   Genitourinary:      Genitourinary Comments:   Normal external female genitalia  Parous cervix  Physiologic discharge  No cervical polyps or lesions identified. No vaginal lesions.         Right Adnexa: not tender, not full and no mass present.     Left Adnexa: not tender, not full and no mass present.  HENT:      Head: Normocephalic.      Nose: Nose normal.      Mouth/Throat:      Pharynx: Oropharynx is clear.   Eyes:      Conjunctiva/sclera: Conjunctivae normal.   Cardiovascular:      Rate and Rhythm: Normal rate.   Pulmonary:      Effort: Pulmonary effort is normal. No respiratory distress.   Abdominal:      General: There is no distension.      Palpations: Abdomen is soft.      Tenderness: There is no abdominal tenderness. There is no guarding.   Musculoskeletal:         General: No tenderness.      Right lower leg: No edema.      Left lower leg: No edema.   Neurological:      Mental Status: She is alert.   Skin:     General: Skin is warm and dry.      Coloration: Skin is not jaundiced or pale.   Psychiatric:         Mood and Affect: Mood normal.         Behavior: Behavior normal. "   Vitals reviewed.           Rivka Mckeon MD  1/29/2025  2:35 PM

## 2025-01-29 NOTE — ASSESSMENT & PLAN NOTE
Will assess for bleeding disorder with PT, PTT, and CBC  Will assess pelvic anatomy for sources of bleeding such as polyp or fibroid  Will continue with Vestura while waiting for results  Return to office in 2 months to review results and reassess. Open to adjusting contraception if needed.   Orders:    TSH, 3rd generation with Free T4 reflex; Future    Protime-INR; Future    APTT; Future    CBC and differential; Future    US pelvis complete w transvaginal; Future

## 2025-01-30 ENCOUNTER — HOSPITAL ENCOUNTER (OUTPATIENT)
Dept: ULTRASOUND IMAGING | Facility: HOSPITAL | Age: 30
End: 2025-01-30
Payer: COMMERCIAL

## 2025-01-30 ENCOUNTER — APPOINTMENT (OUTPATIENT)
Dept: LAB | Age: 30
End: 2025-01-30
Payer: COMMERCIAL

## 2025-01-30 DIAGNOSIS — N93.9 EPISODE OF HEAVY VAGINAL BLEEDING: ICD-10-CM

## 2025-01-30 DIAGNOSIS — L65.9 HAIR LOSS: ICD-10-CM

## 2025-01-30 LAB
APTT PPP: 30 SECONDS (ref 23–34)
BASOPHILS # BLD AUTO: 0.01 THOUSANDS/ΜL (ref 0–0.1)
BASOPHILS NFR BLD AUTO: 0 % (ref 0–1)
EOSINOPHIL # BLD AUTO: 0.08 THOUSAND/ΜL (ref 0–0.61)
EOSINOPHIL NFR BLD AUTO: 1 % (ref 0–6)
ERYTHROCYTE [DISTWIDTH] IN BLOOD BY AUTOMATED COUNT: 11.9 % (ref 11.6–15.1)
HCT VFR BLD AUTO: 38.8 % (ref 34.8–46.1)
HGB BLD-MCNC: 13.1 G/DL (ref 11.5–15.4)
IMM GRANULOCYTES # BLD AUTO: 0.02 THOUSAND/UL (ref 0–0.2)
IMM GRANULOCYTES NFR BLD AUTO: 0 % (ref 0–2)
INR PPP: 0.97 (ref 0.85–1.19)
LYMPHOCYTES # BLD AUTO: 2.13 THOUSANDS/ΜL (ref 0.6–4.47)
LYMPHOCYTES NFR BLD AUTO: 30 % (ref 14–44)
MCH RBC QN AUTO: 32.4 PG (ref 26.8–34.3)
MCHC RBC AUTO-ENTMCNC: 33.8 G/DL (ref 31.4–37.4)
MCV RBC AUTO: 96 FL (ref 82–98)
MONOCYTES # BLD AUTO: 0.39 THOUSAND/ΜL (ref 0.17–1.22)
MONOCYTES NFR BLD AUTO: 6 % (ref 4–12)
NEUTROPHILS # BLD AUTO: 4.38 THOUSANDS/ΜL (ref 1.85–7.62)
NEUTS SEG NFR BLD AUTO: 63 % (ref 43–75)
NRBC BLD AUTO-RTO: 0 /100 WBCS
PLATELET # BLD AUTO: 362 THOUSANDS/UL (ref 149–390)
PMV BLD AUTO: 9.2 FL (ref 8.9–12.7)
PROTHROMBIN TIME: 13.2 SECONDS (ref 12.3–15)
RBC # BLD AUTO: 4.04 MILLION/UL (ref 3.81–5.12)
TSH SERPL DL<=0.05 MIU/L-ACNC: 2.09 UIU/ML (ref 0.45–4.5)
WBC # BLD AUTO: 7.01 THOUSAND/UL (ref 4.31–10.16)

## 2025-01-30 PROCEDURE — 85730 THROMBOPLASTIN TIME PARTIAL: CPT

## 2025-01-30 PROCEDURE — 76856 US EXAM PELVIC COMPLETE: CPT

## 2025-01-30 PROCEDURE — 85025 COMPLETE CBC W/AUTO DIFF WBC: CPT

## 2025-01-30 PROCEDURE — 84403 ASSAY OF TOTAL TESTOSTERONE: CPT

## 2025-01-30 PROCEDURE — 36415 COLL VENOUS BLD VENIPUNCTURE: CPT

## 2025-01-30 PROCEDURE — 84443 ASSAY THYROID STIM HORMONE: CPT

## 2025-01-30 PROCEDURE — 76830 TRANSVAGINAL US NON-OB: CPT

## 2025-01-30 PROCEDURE — 84402 ASSAY OF FREE TESTOSTERONE: CPT

## 2025-01-30 PROCEDURE — 85610 PROTHROMBIN TIME: CPT

## 2025-01-31 ENCOUNTER — RESULTS FOLLOW-UP (OUTPATIENT)
Dept: LABOR AND DELIVERY | Facility: HOSPITAL | Age: 30
End: 2025-01-31

## 2025-01-31 DIAGNOSIS — N93.9 EPISODE OF HEAVY VAGINAL BLEEDING: ICD-10-CM

## 2025-01-31 DIAGNOSIS — L65.9 HAIR LOSS: Primary | ICD-10-CM

## 2025-01-31 LAB
TESTOST FREE SERPL-MCNC: <0.2 PG/ML (ref 0–4.2)
TESTOST SERPL-MCNC: <3 NG/DL (ref 13–71)

## 2025-02-04 DIAGNOSIS — Z00.6 ENCOUNTER FOR EXAMINATION FOR NORMAL COMPARISON OR CONTROL IN CLINICAL RESEARCH PROGRAM: ICD-10-CM

## 2025-03-03 DIAGNOSIS — N92.1 BREAKTHROUGH BLEEDING ON OCPS: ICD-10-CM

## 2025-03-03 DIAGNOSIS — N94.6 DYSMENORRHEA: ICD-10-CM

## 2025-03-05 RX ORDER — DROSPIRENONE AND ETHINYL ESTRADIOL 0.02-3(28)
1 KIT ORAL DAILY
Qty: 84 TABLET | Refills: 1 | Status: SHIPPED | OUTPATIENT
Start: 2025-03-05 | End: 2025-03-12 | Stop reason: SDUPTHER

## 2025-03-06 ENCOUNTER — OFFICE VISIT (OUTPATIENT)
Dept: ENDOCRINOLOGY | Facility: CLINIC | Age: 30
End: 2025-03-06
Payer: COMMERCIAL

## 2025-03-06 VITALS
HEIGHT: 59 IN | HEART RATE: 97 BPM | SYSTOLIC BLOOD PRESSURE: 120 MMHG | WEIGHT: 129.8 LBS | BODY MASS INDEX: 26.17 KG/M2 | DIASTOLIC BLOOD PRESSURE: 76 MMHG | OXYGEN SATURATION: 98 % | RESPIRATION RATE: 18 BRPM | TEMPERATURE: 98.1 F

## 2025-03-06 DIAGNOSIS — L63.9 ALOPECIA AREATA: Primary | ICD-10-CM

## 2025-03-06 DIAGNOSIS — R79.89 LOW TESTOSTERONE LEVEL IN FEMALE: ICD-10-CM

## 2025-03-06 PROCEDURE — 99204 OFFICE O/P NEW MOD 45 MIN: CPT | Performed by: STUDENT IN AN ORGANIZED HEALTH CARE EDUCATION/TRAINING PROGRAM

## 2025-03-06 NOTE — ASSESSMENT & PLAN NOTE
Testosterone was checked while she is on oral contraceptives, which is known decreasing testosterone with suppressing pituitary-ovarian axis.  Therefore this most likely ESTER effects.  Additionally, she has no clinical stigmata concerning for adrenal insufficiency, I ordered DHEA-S low, and repeated testosterone.      Orders:    DHEA-sulfate; Future    Testosterone, Free+Total LC/MS; Future

## 2025-03-06 NOTE — ASSESSMENT & PLAN NOTE
She is referred for hair loss which she has been experiencing for last 2 months, although my exam revealed that she has circular patchy hair loss, which is more consistent with alopecia areata, for which she was referred to dermatology team for further evaluation.

## 2025-03-06 NOTE — PROGRESS NOTES
Name: Maricarmen Garcia      : 1995      MRN: [id number]  Encounter Provider: Eugene Serrano MD  Encounter Date: 3/6/2025   Encounter department: [facility] [place]    Chief Complaint   Patient presents with    Advice Only   :  Assessment & Plan  Alopecia areata  She is referred for hair loss which she has been experiencing for last 2 months, although my exam revealed that she has circular patchy hair loss, which is more consistent with alopecia areata, for which she was referred to dermatology team for further evaluation.           Low testosterone level in female  Testosterone was checked while she is on oral contraceptives, which is known decreasing testosterone with suppressing pituitary-ovarian axis.  Therefore this most likely ESTER effects.  Additionally, she has no clinical stigmata concerning for adrenal insufficiency, I ordered DHEA-S low, and repeated testosterone.      Orders:    DHEA-sulfate; Future    Testosterone, Free+Total LC/MS; Future        History of Present Illness   History of Present Illness    Maricarmen Garcia is a 29 y.o. female who presents for evaluation of her loss and heavy menstruation at the request of LARISSA Long    She presents with her mom for support.    Patient reports heavy menstruations, despite she is using oral contraceptives which is associated with cramping and irregular cycles.  A pelvic ultrasound done by OB/GYN team which showed normal ovaries, with heterogeneous myometrium, cannot exclude adenomyosis.  She reports having menarche at age 11, and her cycles always being irregular and heavy with the spasm.    She reports that for the last 2 months she noticed that she losing hair in clumps,      No hirsutism, no Acne    she reports insomnia, difficulty falling sleep, 5- 6 hrs sleep.    She has 2 kids, and youngest one, 2 yrs , both     She reports pneumonia in November.  No recent Covid-19 infection    She reports  decreased sex drive.  Denies skin tone changes, chronic diarrhea or weight loss.          Pertinent Medical History           Review of Systems as per HPI  Medical History Reviewed by provider this encounter:     .  Current Outpatient Medications on File Prior to Visit   Medication Sig Dispense Refill    albuterol (2.5 mg/3 mL) 0.083 % nebulizer solution Take 3 mL (2.5 mg total) by nebulization every 6 (six) hours as needed for wheezing or shortness of breath 180 mL 5    albuterol (PROVENTIL HFA,VENTOLIN HFA) 90 mcg/act inhaler Inhale 2 puffs every 4 (four) hours as needed for wheezing or shortness of breath 18 g 3    drospirenone-ethinyl estradiol (Vestura) 3-0.02 MG per tablet Take 1 tablet by mouth daily 84 tablet 1    famotidine (PEPCID) 40 MG tablet TAKE 1 TABLET (40 MG TOTAL) BY MOUTH TWICE A DAY AS NEEDED FOR HEARTBURN 180 tablet 1    budesonide-formoterol (Symbicort) 80-4.5 MCG/ACT inhaler Inhale 2 puffs 2 (two) times a day Rinse mouth after use. (Patient not taking: Reported on 3/6/2025) 10.2 g 2    dicyclomine (BENTYL) 20 mg tablet Take 1 tablet (20 mg total) by mouth every 6 (six) hours (Patient not taking: Reported on 1/3/2025) 45 tablet 2    ibuprofen (MOTRIN) 600 mg tablet Take 1 tablet (600 mg total) by mouth every 6 (six) hours as needed for mild pain or moderate pain 30 tablet 0    ondansetron (ZOFRAN) 4 mg tablet Take 1 tablet (4 mg total) by mouth every 8 (eight) hours as needed for nausea or vomiting (Patient not taking: Reported on 1/3/2025) 30 tablet 2    predniSONE 10 mg tablet Take 4 tablets with food on days 1 & 2. Then take 3 tablets with food on days 3 &4. Then take 2 tablets with food on days 5 & 6. Then take 1 tablet with food on days 7 & 8. (Patient not taking: Reported on 1/29/2025) 20 tablet 0     No current facility-administered medications on file prior to visit.         Medical History Reviewed by provider this encounter:     .    Objective   /76 (BP Location: Right arm,  "Patient Position: Sitting, Cuff Size: Adult)   Pulse 97   Temp 98.1 °F (36.7 °C) (Temporal)   Resp 18   Ht 4' 11\" (1.499 m)   Wt 58.9 kg (129 lb 12.8 oz)   SpO2 98%   BMI 26.22 kg/m²      Body mass index is 26.22 kg/m².  Wt Readings from Last 3 Encounters:   03/06/25 58.9 kg (129 lb 12.8 oz)   01/29/25 57.7 kg (127 lb 3.2 oz)   01/03/25 58.6 kg (129 lb 2 oz)     Physical Exam  Vitals and nursing note reviewed.   Constitutional:       General: She is not in acute distress.     Appearance: She is well-developed.   HENT:      Head: Normocephalic and atraumatic.   Cardiovascular:      Rate and Rhythm: Normal rate and regular rhythm.   Pulmonary:      Effort: Pulmonary effort is normal. No respiratory distress.   Abdominal:      Palpations: Abdomen is soft.   Musculoskeletal:         General: No swelling.      Cervical back: Neck supple.   Skin:     General: Skin is warm and dry.      Comments: Circular hair loss patches over her scalp   Neurological:      Mental Status: She is alert.       Physical Exam      Results    Labs:   Lab Results   Component Value Date    HGBA1C 5.2 08/27/2024     Lab Results   Component Value Date    CREATININE 0.72 08/27/2024    CREATININE 0.75 01/17/2024    CREATININE 0.64 08/07/2022    BUN 13 08/27/2024    K 4.1 08/27/2024     08/27/2024    CO2 25 08/27/2024     GFR, Calculated   Date Value Ref Range Status   05/07/2020 131 >60 mL/min/1.73m2 Final     Comment:     mL/min per 1.73 square meters                                            Normal Function or Mild Renal    Disease (if clinically at risk):  >or=60  Moderately Decreased:                30-59  Severely Decreased:                  15-29  Renal Failure:                         <15                                            -American GFR: multiply reported GFR by 1.16    Please note that the eGFR is based on the CKD-EPI calculation, and is not intended to be used for drug dosing.                                     " "       Note: Calculated GFR may not be an accurate indicator of renal function if the patient's renal function is not in a steady state.     eGFRcr   Date Value Ref Range Status   08/07/2022 124 >59 Final     Comment:     Specimen slightly hemolyzed, results may be affected.     eGFR   Date Value Ref Range Status   08/27/2024 113 ml/min/1.73sq m Final     No results found for: \"CHOL\", \"HDL\", \"LDL\", \"TRIG\", \"CHOLHDL\"  Lab Results   Component Value Date    ALT 13 08/27/2024    AST 14 08/27/2024    ALKPHOS 46 08/27/2024     Lab Results   Component Value Date    CIK2LWBCWNRS 2.094 01/30/2025    [id number] 2.167 08/27/2024     No results found for: \"FREET4\", \"TSI\"    There are no Patient Instructions on file for this visit.    Discussed with the patient and all questioned fully answered. She will call me if any problems arise.    Component      Latest Ref Rng 1/30/2025   TESTOSTERONE FREE      0.0 - 4.2 pg/mL <0.2    Testosterone, Total, LC/MS      13 - 71 ng/dL <3 (L)       Legend:  (L) Low  Administrative Statements   I have spent a total time of 40 minutes in caring for this patient on the day of the visit/encounter including Diagnostic results, Prognosis, Risks and benefits of tx options, Instructions for management, Patient and family education, Importance of tx compliance, Risk factor reductions, Impressions, Counseling / Coordination of care, Documenting in the medical record, Reviewing/placing orders in the medical record (including tests, medications, and/or procedures), and Obtaining or reviewing history  .  "

## 2025-03-12 DIAGNOSIS — N94.6 DYSMENORRHEA: ICD-10-CM

## 2025-03-12 DIAGNOSIS — N92.1 BREAKTHROUGH BLEEDING ON OCPS: ICD-10-CM

## 2025-03-12 RX ORDER — DROSPIRENONE AND ETHINYL ESTRADIOL 0.02-3(28)
1 KIT ORAL DAILY
Qty: 84 TABLET | Refills: 1 | Status: SHIPPED | OUTPATIENT
Start: 2025-03-12

## 2025-04-18 ENCOUNTER — OFFICE VISIT (OUTPATIENT)
Dept: URGENT CARE | Age: 30
End: 2025-04-18
Payer: COMMERCIAL

## 2025-04-18 VITALS
SYSTOLIC BLOOD PRESSURE: 114 MMHG | TEMPERATURE: 97.6 F | RESPIRATION RATE: 18 BRPM | DIASTOLIC BLOOD PRESSURE: 74 MMHG | OXYGEN SATURATION: 99 % | HEART RATE: 90 BPM

## 2025-04-18 DIAGNOSIS — J20.9 ACUTE BRONCHITIS, UNSPECIFIED ORGANISM: Primary | ICD-10-CM

## 2025-04-18 PROCEDURE — 99213 OFFICE O/P EST LOW 20 MIN: CPT

## 2025-04-18 RX ORDER — AZITHROMYCIN 250 MG/1
TABLET, FILM COATED ORAL
Qty: 6 TABLET | Refills: 0 | Status: SHIPPED | OUTPATIENT
Start: 2025-04-18 | End: 2025-04-22

## 2025-04-18 NOTE — PROGRESS NOTES
[facility]        NAME: Maricarmen Garcia is a 29 y.o. female  : 1995    MRN: [id number]  DATE: 2025  TIME: 8:10 PM    Assessment and Plan   Acute bronchitis, unspecified organism [J20.9]  1. Acute bronchitis, unspecified organism  azithromycin (ZITHROMAX) 250 mg tablet           Symptoms and exam consistent with acute bronchitis. Given patient has had prolonged symtpoms, will cover for bacterial causes with azithromycin (hopefully this will also help with the inflammation).     Patient Instructions       Follow up with PCP in 3-5 days.  Proceed to  ER if symptoms worsen.    If tests have been performed at [facility], our office will contact you with results if changes need to be made to the care plan discussed with you at the visit.  You can review your full results on [facility] MyChart.    Chief Complaint     Chief Complaint   Patient presents with   • Cough   • Cold Like Symptoms   • Fever     Febrile this morning 100.7. Symptoms started Wednesday.          History of Present Illness       Pt is a 29 year old female with a history of Asthma and Lupus, presenting with 3 days of congestion, fever Tmax 100.7, and chest tightness.  She reports using her albuterol inhaler for relief of SOB and tylenol for other symptoms.  She denies CP, difficulty breathing, fever or chills, nausea, vomiting, or diarrhea.  She reports being out of her inhaler.     Cough  Associated symptoms include a fever and shortness of breath. Pertinent negatives include no chest pain, chills, headaches, postnasal drip, rhinorrhea or sore throat.   Fever  Associated symptoms include congestion, coughing and a fever. Pertinent negatives include no chest pain, chills, fatigue, headaches, numbness or sore throat.       Review of Systems   Review of Systems   Constitutional:  Positive for fever. Negative for chills and fatigue.   HENT:  Positive for congestion, sinus pressure and sinus pain. Negative for postnasal drip,  rhinorrhea, sore throat and tinnitus.    Respiratory:  Positive for cough and shortness of breath.    Cardiovascular:  Negative for chest pain.   Neurological:  Negative for dizziness, numbness and headaches.         Current Medications       Current Outpatient Medications:   •  albuterol (2.5 mg/3 mL) 0.083 % nebulizer solution, Take 3 mL (2.5 mg total) by nebulization every 6 (six) hours as needed for wheezing or shortness of breath, Disp: 180 mL, Rfl: 5  •  albuterol (PROVENTIL HFA,VENTOLIN HFA) 90 mcg/act inhaler, Inhale 2 puffs every 4 (four) hours as needed for wheezing or shortness of breath, Disp: 18 g, Rfl: 3  •  azithromycin (ZITHROMAX) 250 mg tablet, Take 2 tablets today then 1 tablet daily x 4 days, Disp: 6 tablet, Rfl: 0  •  drospirenone-ethinyl estradiol (Vestura) 3-0.02 MG per tablet, Take 1 tablet by mouth daily Take hormonal pills continuously, skipping placebo pills., Disp: 84 tablet, Rfl: 1  •  famotidine (PEPCID) 40 MG tablet, TAKE 1 TABLET (40 MG TOTAL) BY MOUTH TWICE A DAY AS NEEDED FOR HEARTBURN, Disp: 180 tablet, Rfl: 1  •  budesonide-formoterol (Symbicort) 80-4.5 MCG/ACT inhaler, Inhale 2 puffs 2 (two) times a day Rinse mouth after use. (Patient not taking: Reported on 4/18/2025), Disp: 10.2 g, Rfl: 2  •  dicyclomine (BENTYL) 20 mg tablet, Take 1 tablet (20 mg total) by mouth every 6 (six) hours (Patient not taking: Reported on 1/3/2025), Disp: 45 tablet, Rfl: 2  •  ibuprofen (MOTRIN) 600 mg tablet, Take 1 tablet (600 mg total) by mouth every 6 (six) hours as needed for mild pain or moderate pain, Disp: 30 tablet, Rfl: 0  •  ondansetron (ZOFRAN) 4 mg tablet, Take 1 tablet (4 mg total) by mouth every 8 (eight) hours as needed for nausea or vomiting (Patient not taking: Reported on 1/3/2025), Disp: 30 tablet, Rfl: 2  •  predniSONE 10 mg tablet, Take 4 tablets with food on days 1 & 2. Then take 3 tablets with food on days 3 &4. Then take 2 tablets with food on days 5 & 6. Then take 1 tablet  with food on days 7 & 8. (Patient not taking: Reported on 2025), Disp: 20 tablet, Rfl: 0    Current Allergies     Allergies as of 2025 - Reviewed 2025   Allergen Reaction Noted   • Amoxicillin-pot clavulanate GI Intolerance 2022   • Benzocaine Swelling 04/15/2020   • Erythromycin  2016   • Erythromycin base Nausea Only 2015   • Latex Hives 2022   • Other  2015            The following portions of the patient's history were reviewed and updated as appropriate: allergies, current medications, past family history, past medical history, past social history, past surgical history and problem list.     Past Medical History:   Diagnosis Date   • Anemia    • Arthritis    • Asthma    • Endometriosis 10/19/2023   • Lupus     8 years   • No known problems        Past Surgical History:   Procedure Laterality Date   • ABDOMINAL SURGERY  2022    Csection   •  SECTION     •  SECTION     • NO PAST SURGERIES     • US GUIDED MASS BIOPSY (UNSPECIFIED) Right 2024    Foot biopsy   • WISDOM TOOTH EXTRACTION         Family History   Problem Relation Age of Onset   • Asthma Mother    • Other Mother         gestational lupus   • Hypertension Mother    • Kidney disease Mother    • Miscarriages / Stillbirths Mother    • Mental illness Maternal Grandmother    • Bipolar disorder Maternal Grandmother    • Alcohol abuse Paternal Grandfather    • Breast cancer Neg Hx    • Colon cancer Neg Hx    • Substance Abuse Neg Hx          Medications have been verified.        Objective   /74   Pulse 90   Temp 97.6 °F (36.4 °C)   Resp 18   SpO2 99%   No LMP recorded.       Physical Exam     Physical Exam  Vitals and nursing note reviewed.   Constitutional:       General: She is not in acute distress.     Appearance: Normal appearance. She is normal weight. She is not ill-appearing.   HENT:      Right Ear: Tympanic membrane normal. There is impacted cerumen.      Left  Ear: Tympanic membrane normal.      Nose: Congestion and rhinorrhea present.      Right Turbinates: Not enlarged.      Left Turbinates: Not enlarged.      Right Sinus: Maxillary sinus tenderness and frontal sinus tenderness present.      Left Sinus: Maxillary sinus tenderness and frontal sinus tenderness present.      Mouth/Throat:      Mouth: Mucous membranes are moist.      Pharynx: Posterior oropharyngeal erythema present.   Eyes:      Extraocular Movements: Extraocular movements intact.   Cardiovascular:      Rate and Rhythm: Normal rate and regular rhythm.      Pulses: Normal pulses.      Heart sounds: Normal heart sounds.   Pulmonary:      Effort: Pulmonary effort is normal. No respiratory distress.      Breath sounds: Normal breath sounds. No stridor. No wheezing, rhonchi or rales.   Chest:      Chest wall: No tenderness.   Lymphadenopathy:      Cervical: No cervical adenopathy.   Skin:     Capillary Refill: Capillary refill takes less than 2 seconds.   Neurological:      General: No focal deficit present.      Mental Status: She is alert.

## 2025-04-19 NOTE — PATIENT INSTRUCTIONS
Take antibiotic- Zithromax as directed.  Recommend daily probiotic while on antibiotic or eat yogurt with live cultures daily while on antibiotic.       You may take Tylenol or Motrin for pain and fever.    Albuterol inhaler for wheezing and shortness of breath    Rest, increase fluids, good hand washing.  Please follow up with your family doctor if no improvement in 7-10 days.

## 2025-05-08 ENCOUNTER — OFFICE VISIT (OUTPATIENT)
Dept: GASTROENTEROLOGY | Facility: CLINIC | Age: 30
End: 2025-05-08
Payer: COMMERCIAL

## 2025-05-08 VITALS
RESPIRATION RATE: 18 BRPM | DIASTOLIC BLOOD PRESSURE: 76 MMHG | BODY MASS INDEX: 24.6 KG/M2 | TEMPERATURE: 98 F | WEIGHT: 122 LBS | SYSTOLIC BLOOD PRESSURE: 118 MMHG | HEIGHT: 59 IN | OXYGEN SATURATION: 97 % | HEART RATE: 95 BPM

## 2025-05-08 DIAGNOSIS — R11.0 NAUSEA: ICD-10-CM

## 2025-05-08 DIAGNOSIS — M79.9 MUSCULOSKELETAL DISORDER: Primary | ICD-10-CM

## 2025-05-08 DIAGNOSIS — R10.11 RUQ PAIN: ICD-10-CM

## 2025-05-08 PROCEDURE — 99213 OFFICE O/P EST LOW 20 MIN: CPT | Performed by: PHYSICIAN ASSISTANT

## 2025-05-08 RX ORDER — PREDNISONE 20 MG/1
TABLET ORAL
COMMUNITY
Start: 2025-03-09

## 2025-05-08 RX ORDER — ONDANSETRON 4 MG/1
4 TABLET, FILM COATED ORAL EVERY 8 HOURS PRN
Qty: 30 TABLET | Refills: 2 | Status: SHIPPED | OUTPATIENT
Start: 2025-05-08

## 2025-05-08 RX ORDER — METHOCARBAMOL 500 MG/1
500 TABLET, FILM COATED ORAL EVERY 8 HOURS PRN
Qty: 30 TABLET | Refills: 3 | Status: SHIPPED | OUTPATIENT
Start: 2025-05-08

## 2025-05-08 RX ORDER — KETOCONAZOLE 20 MG/ML
SHAMPOO, SUSPENSION TOPICAL
COMMUNITY
Start: 2025-03-06

## 2025-05-08 RX ORDER — DEXTROMETHORPHAN HYDROBROMIDE AND PROMETHAZINE HYDROCHLORIDE 15; 6.25 MG/5ML; MG/5ML
SYRUP ORAL
COMMUNITY
Start: 2025-03-09

## 2025-05-08 RX ORDER — BENZONATATE 200 MG/1
CAPSULE ORAL
COMMUNITY
Start: 2025-03-09

## 2025-05-08 RX ORDER — CLOBETASOL PROPIONATE 0.5 MG/ML
SOLUTION TOPICAL
COMMUNITY
Start: 2025-03-06

## 2025-05-08 NOTE — PROGRESS NOTES
Name: Maricarmen Garcia      : 1995      MRN: [id number]  Encounter Provider: Tosha Deal PA-C  Encounter Date: 2025   Encounter department: [facility] [place]  :  Assessment & Plan  RUQ pain  Had negative ultrasound.  She was planned to have gastric emptying study and HIDA scan with [facility], but the patient reports that between her being diagnosed with COVID and her children were sick with the flu, she then did not reschedule again.  -Printed order for HIDA scan with [facility] and gastric emptying study  -If testing is negative, will order CT scan  Nausea    Orders:    ondansetron (ZOFRAN) 4 mg tablet; Take 1 tablet (4 mg total) by mouth every 8 (eight) hours as needed for nausea or vomiting  Refilled Zofran for patient.  Musculoskeletal disorder    Orders:    methocarbamol (ROBAXIN) 500 mg tablet; Take 1 tablet (500 mg total) by mouth every 8 (eight) hours as needed for muscle spasms  On exam, patient has rather superficial pain when palpating near the right upper/mid quadrant.  Will prescribe Robaxin to take at nighttime.  Patient agrees with above plan.  Further recommendations based on above findings.    History of Present Illness   HPI  Maricarmen Garcia is a 29 y.o. female with history of asthma who presents to the office today for chronic right upper quadrant pain associated with nausea without vomiting.  Patient was last seen by me in 2024.  Patient denies change in bowel habits, constipation, diarrhea, or lower abdominal pain.  Her pain is usually worse after eating, especially at nighttime.    Last right upper quadrant ultrasound was in 2024 revealing no gallstones.  Last EGD was performed by Dr. Lopez in 2024 which was normal in appearance according to report.  Biopsies were negative for changes suggesting celiac disease or H. pylori.  Patient was recommended a gastric emptying study and HIDA scan with [facility].      Review of Systems   Constitutional:   Negative for appetite change, chills, diaphoresis, fatigue and unexpected weight change.   HENT:  Negative for sore throat and trouble swallowing.    Eyes:  Negative for discharge and redness.   Respiratory:  Negative for cough, shortness of breath and wheezing.    Cardiovascular:  Negative for chest pain and palpitations.   Gastrointestinal:  Positive for abdominal pain. Negative for anal bleeding, blood in stool, constipation, diarrhea, nausea, rectal pain and vomiting.   Endocrine: Negative for cold intolerance and heat intolerance.   Musculoskeletal:  Negative for joint swelling and myalgias.   Skin:  Negative for pallor and rash.   Neurological:  Negative for dizziness, tremors, weakness, light-headedness, numbness and headaches.   Hematological:  Negative for adenopathy. Does not bruise/bleed easily.   Psychiatric/Behavioral:  Negative for behavioral problems, confusion, dysphoric mood and sleep disturbance. The patient is not nervous/anxious.           Objective   There were no vitals taken for this visit.     Physical Exam  Constitutional:       General: She is not in acute distress.     Appearance: She is well-developed. She is not diaphoretic.   HENT:      Head: Normocephalic and atraumatic.   Eyes:      General: No scleral icterus.     Conjunctiva/sclera: Conjunctivae normal.      Pupils: Pupils are equal, round, and reactive to light.   Neck:      Thyroid: No thyromegaly.      Trachea: No tracheal deviation.   Cardiovascular:      Rate and Rhythm: Normal rate and regular rhythm.   Pulmonary:      Effort: Pulmonary effort is normal.      Breath sounds: Normal breath sounds. No wheezing or rales.   Abdominal:      General: Bowel sounds are normal. There is no distension.      Palpations: Abdomen is soft. Abdomen is not rigid. There is no mass.      Tenderness: There is abdominal tenderness. There is no guarding or rebound.      Comments: Superficial tenderness when palpating in the right upper quadrant    Musculoskeletal:      Cervical back: Neck supple.   Lymphadenopathy:      Cervical: No cervical adenopathy.   Skin:     General: Skin is warm and dry.      Findings: No erythema or rash.   Neurological:      Mental Status: She is alert and oriented to person, place, and time.   Psychiatric:         Behavior: Behavior normal.

## 2025-05-12 ENCOUNTER — APPOINTMENT (OUTPATIENT)
Dept: LAB | Facility: MEDICAL CENTER | Age: 30
End: 2025-05-12
Attending: PATHOLOGY
Payer: COMMERCIAL

## 2025-05-12 DIAGNOSIS — R79.89 LOW TESTOSTERONE LEVEL IN FEMALE: ICD-10-CM

## 2025-05-12 DIAGNOSIS — Z00.6 ENCOUNTER FOR EXAMINATION FOR NORMAL COMPARISON OR CONTROL IN CLINICAL RESEARCH PROGRAM: ICD-10-CM

## 2025-05-12 PROCEDURE — 82627 DEHYDROEPIANDROSTERONE: CPT

## 2025-05-12 PROCEDURE — 84402 ASSAY OF FREE TESTOSTERONE: CPT

## 2025-05-12 PROCEDURE — 36415 COLL VENOUS BLD VENIPUNCTURE: CPT

## 2025-05-12 PROCEDURE — 84403 ASSAY OF TOTAL TESTOSTERONE: CPT

## 2025-05-14 ENCOUNTER — RESULTS FOLLOW-UP (OUTPATIENT)
Dept: ENDOCRINOLOGY | Facility: CLINIC | Age: 30
End: 2025-05-14

## 2025-05-14 LAB — DHEA-S SERPL-MCNC: 95.4 UG/DL (ref 84.8–378)

## 2025-05-16 LAB
TESTOST FREE SERPL-MCNC: <0.2 PG/ML (ref 0–4.2)
TESTOST SERPL-MCNC: 4 NG/DL (ref 13–71)

## 2025-05-22 NOTE — PROGRESS NOTES
Name: Maricarmen Garcia      : 1995      MRN: [id number]  Encounter Provider: LARISSA Raymond  Encounter Date: 2025   [facility]  :  Assessment & Plan  Endometriosis  -Reviewed prolonged history and duration of symptoms; likely related to endometriosis.  -Advised to continue care with PCP, GI, and dermatology for follow-up care related to other symptoms (fatigue, GI, and hair loss).  -Counseled patient on the pathophysiology of endometriosis and reviewed symptomatolgy.   -Discussed treatment options for endometriosis including:  -continuing with continuous ESTER to decrease risk of lesion growth.  - hormone suppression with medications: Orlissa, Myfembree, and Depo Lupron. Reviewed mechanism of action, risks, benefits, and side effects. Discussed side effects of menopausal symptoms (hot flashes, night sweats, insomnia, vaginal dryness, and irritability).   -Endometrial fulguration: removal of endometriosis lesions. Reviewed risks of surgery. Advised suppressive therapy post surgery with continuous ESTER or other medications.  -Hysterectomy: patient is unsure of future pregnancies. Reviewed risks and concerns of surgery along with potential benefits for endometriosis management. Reviewed that ovaries are unlikely to be removed due to menopausal risks.  -She verbalized understanding of options. Plans to make a follow up appointment for surgical discussion with physician.           History of Present Illness   HPI  Maricarmen Garcia is a 29 y.o. female who presents for hormonal concerns. She presents with her mother.      Her main concern today is regarding current symptoms of fatigue, pelvic cramping, bloating, and weight fluctuations.  Symptoms started approximately 2 years ago.  She reports extreme bloating with weight gain of 11 lbs (and then subsequent weight loss).   She is working with GI to r/o gastroparesis and gall bladder dysfunction.  However,  "she was previously diagnosed with endometriosis and wanted clarification if symptoms could be related along with management options.    She also reports history of low testosterone, alopecia areata, and menorrhagia.  Low testosterone and alopecia has been evaluated by endocrine and cleared.  Last testosterone: 4 on 5/12/25  She plans to establish care with Dermatiology for alopecia.    GYN:  Menses: age 11  LMP: unknown  She is on continuous ESTER for management of menorrhagia and correlating symptoms.  She is on Vestura (started Jan 2024).  She was previously on Sprintec but had BTB.  Symptoms include extreme cramping/pain, heavy bleeding, fevers, vomiting.  She was previously dx with endometriosis.    Last TSH: 01/2025 WNL.  Bleeding coags: WNL 01/2025.  TVUS: 01/2025: normal, except for heterogeneous myometrium (cannot exclude adenomyosis).      History obtained from: patient    Review of Systems  Medical History Reviewed by provider this encounter:     .  Medications Ordered Prior to Encounter[1]   Social History[2]     Objective   /80 (BP Location: Left arm, Patient Position: Sitting, Cuff Size: Standard)   Ht 4' 11\" (1.499 m)   Wt 57.6 kg (127 lb)   LMP  (LMP Unknown)   BMI 25.65 kg/m²      Physical Exam           [1]   Current Outpatient Medications on File Prior to Visit   Medication Sig Dispense Refill    albuterol (2.5 mg/3 mL) 0.083 % nebulizer solution Take 3 mL (2.5 mg total) by nebulization every 6 (six) hours as needed for wheezing or shortness of breath 180 mL 5    albuterol (PROVENTIL HFA,VENTOLIN HFA) 90 mcg/act inhaler Inhale 2 puffs every 4 (four) hours as needed for wheezing or shortness of breath 18 g 3    drospirenone-ethinyl estradiol (Vestura) 3-0.02 MG per tablet Take 1 tablet by mouth daily Take hormonal pills continuously, skipping placebo pills. 84 tablet 1    famotidine (PEPCID) 40 MG tablet TAKE 1 TABLET (40 MG TOTAL) BY MOUTH TWICE A DAY AS NEEDED FOR HEARTBURN 180 tablet 1    " ketoconazole (NIZORAL) 2 % shampoo       methocarbamol (ROBAXIN) 500 mg tablet Take 1 tablet (500 mg total) by mouth every 8 (eight) hours as needed for muscle spasms 30 tablet 3    ondansetron (ZOFRAN) 4 mg tablet Take 1 tablet (4 mg total) by mouth every 8 (eight) hours as needed for nausea or vomiting 30 tablet 2    benzonatate (TESSALON) 200 MG capsule take 1 capsule by mouth 3 times a day as needed for cough      budesonide-formoterol (Symbicort) 80-4.5 MCG/ACT inhaler Inhale 2 puffs 2 (two) times a day Rinse mouth after use. (Patient not taking: Reported on 5/27/2025) 10.2 g 2    clobetasol (TEMOVATE) 0.05 % external solution PLEASE SEE ATTACHED FOR DETAILED DIRECTIONS      dicyclomine (BENTYL) 20 mg tablet Take 1 tablet (20 mg total) by mouth every 6 (six) hours (Patient not taking: Reported on 1/3/2025) 45 tablet 2    ibuprofen (MOTRIN) 600 mg tablet Take 1 tablet (600 mg total) by mouth every 6 (six) hours as needed for mild pain or moderate pain 30 tablet 0    predniSONE 10 mg tablet Take 4 tablets with food on days 1 & 2. Then take 3 tablets with food on days 3 &4. Then take 2 tablets with food on days 5 & 6. Then take 1 tablet with food on days 7 & 8. (Patient not taking: Reported on 1/29/2025) 20 tablet 0    predniSONE 20 mg tablet TAKE 3 TABLETS DAILY FOR 5 DAYS      promethazine-dextromethorphan (PHENERGAN-DM) 6.25-15 mg/5 mL oral syrup TAKE 5 MLS BY MOUTH EVERY 6 HOURS AS NEEDED FOR COUGH       No current facility-administered medications on file prior to visit.   [2]   Social History  Tobacco Use    Smoking status: Never    Smokeless tobacco: Never   Vaping Use    Vaping status: Never Used   Substance and Sexual Activity    Alcohol use: No    Drug use: No    Sexual activity: Yes     Partners: Male     Birth control/protection: OCP     Comment: April pill

## 2025-05-23 LAB
APOB+LDLR+PCSK9 GENE MUT ANL BLD/T: NOT DETECTED
BRCA1+BRCA2 DEL+DUP + FULL MUT ANL BLD/T: NOT DETECTED
MLH1+MSH2+MSH6+PMS2 GN DEL+DUP+FUL M: NOT DETECTED

## 2025-05-27 ENCOUNTER — OFFICE VISIT (OUTPATIENT)
Dept: OBGYN CLINIC | Facility: CLINIC | Age: 30
End: 2025-05-27
Payer: COMMERCIAL

## 2025-05-27 VITALS
SYSTOLIC BLOOD PRESSURE: 102 MMHG | DIASTOLIC BLOOD PRESSURE: 80 MMHG | WEIGHT: 127 LBS | BODY MASS INDEX: 25.6 KG/M2 | HEIGHT: 59 IN

## 2025-05-27 DIAGNOSIS — N80.9 ENDOMETRIOSIS: Primary | ICD-10-CM

## 2025-05-27 PROCEDURE — 99214 OFFICE O/P EST MOD 30 MIN: CPT

## 2025-05-27 NOTE — ASSESSMENT & PLAN NOTE
-Reviewed prolonged history and duration of symptoms; likely related to endometriosis.  -Advised to continue care with PCP, GI, and dermatology for follow-up care related to other symptoms (fatigue, GI, and hair loss).  -Counseled patient on the pathophysiology of endometriosis and reviewed symptomatolgy.   -Discussed treatment options for endometriosis including:  -continuing with continuous ESTER to decrease risk of lesion growth.  - hormone suppression with medications: Orlissa, Myfembree, and Depo Lupron. Reviewed mechanism of action, risks, benefits, and side effects. Discussed side effects of menopausal symptoms (hot flashes, night sweats, insomnia, vaginal dryness, and irritability).   -Endometrial fulguration: removal of endometriosis lesions. Reviewed risks of surgery. Advised suppressive therapy post surgery with continuous ESTER or other medications.  -Hysterectomy: patient is unsure of future pregnancies. Reviewed risks and concerns of surgery along with potential benefits for endometriosis management. Reviewed that ovaries are unlikely to be removed due to menopausal risks.  -She verbalized understanding of options. Plans to make a follow up appointment for surgical discussion with physician.

## 2025-06-06 ENCOUNTER — HOSPITAL ENCOUNTER (OUTPATIENT)
Dept: RADIOLOGY | Facility: HOSPITAL | Age: 30
Discharge: HOME | End: 2025-06-06
Attending: PHYSICIAN ASSISTANT
Payer: COMMERCIAL

## 2025-06-06 ENCOUNTER — RESULTS FOLLOW-UP (OUTPATIENT)
Dept: GASTROENTEROLOGY | Facility: CLINIC | Age: 30
End: 2025-06-06

## 2025-06-06 DIAGNOSIS — R68.81 EARLY SATIETY: ICD-10-CM

## 2025-06-06 DIAGNOSIS — R10.11 RUQ PAIN: ICD-10-CM

## 2025-06-06 DIAGNOSIS — R11.0 NAUSEA: ICD-10-CM

## 2025-06-06 PROCEDURE — 78227 HEPATOBIL SYST IMAGE W/DRUG: CPT

## 2025-06-06 PROCEDURE — A9537 TC99M MEBROFENIN: HCPCS

## 2025-06-06 RX ORDER — WATER 10 ML/10ML
INJECTION INTRAMUSCULAR; INTRAVENOUS; SUBCUTANEOUS
Status: COMPLETED
Start: 2025-06-06 | End: 2025-06-06

## 2025-06-06 RX ORDER — SINCALIDE 5 UG/5ML
INJECTION, POWDER, LYOPHILIZED, FOR SOLUTION INTRAVENOUS
Status: COMPLETED
Start: 2025-06-06 | End: 2025-06-06

## 2025-06-06 RX ORDER — SINCALIDE 5 UG/5ML
0.02 INJECTION, POWDER, LYOPHILIZED, FOR SOLUTION INTRAVENOUS
Status: DISCONTINUED | OUTPATIENT
Start: 2025-06-06 | End: 2025-06-07 | Stop reason: HOSPADM

## 2025-06-06 RX ADMIN — SINCALIDE 1.2 MCG: 5 INJECTION, POWDER, LYOPHILIZED, FOR SOLUTION INTRAVENOUS at 11:56

## 2025-06-06 RX ADMIN — WATER 5 ML: 1 INJECTION INTRAMUSCULAR; INTRAVENOUS; SUBCUTANEOUS at 11:56

## 2025-06-13 ENCOUNTER — HOSPITAL ENCOUNTER (OUTPATIENT)
Dept: NON INVASIVE DIAGNOSTICS | Facility: CLINIC | Age: 30
Discharge: HOME | End: 2025-06-13
Attending: PHYSICIAN ASSISTANT
Payer: COMMERCIAL

## 2025-06-13 DIAGNOSIS — R11.0 NAUSEA: ICD-10-CM

## 2025-06-13 DIAGNOSIS — R10.11 RUQ PAIN: ICD-10-CM

## 2025-06-13 DIAGNOSIS — R68.81 EARLY SATIETY: ICD-10-CM

## 2025-06-13 PROCEDURE — A9541 TC99M SULFUR COLLOID: HCPCS

## 2025-06-13 PROCEDURE — 78264 GASTRIC EMPTYING IMG STUDY: CPT

## 2025-06-17 ENCOUNTER — TELEPHONE (OUTPATIENT)
Dept: GASTROENTEROLOGY | Facility: CLINIC | Age: 30
End: 2025-06-17

## 2025-06-17 ENCOUNTER — HOSPITAL ENCOUNTER (EMERGENCY)
Facility: HOSPITAL | Age: 30
Discharge: HOME | End: 2025-06-18
Attending: EMERGENCY MEDICINE
Payer: COMMERCIAL

## 2025-06-17 ENCOUNTER — APPOINTMENT (EMERGENCY)
Dept: CT IMAGING | Facility: HOSPITAL | Age: 30
End: 2025-06-17
Payer: COMMERCIAL

## 2025-06-17 VITALS
TEMPERATURE: 98.8 F | RESPIRATION RATE: 16 BRPM | OXYGEN SATURATION: 99 % | HEART RATE: 82 BPM | SYSTOLIC BLOOD PRESSURE: 113 MMHG | DIASTOLIC BLOOD PRESSURE: 68 MMHG

## 2025-06-17 DIAGNOSIS — R10.84 GENERALIZED ABDOMINAL PAIN: Primary | ICD-10-CM

## 2025-06-17 LAB
ALBUMIN SERPL BCG-MCNC: 4.1 G/DL (ref 3.5–5)
ALP SERPL-CCNC: 50 U/L (ref 34–104)
ALT SERPL W P-5'-P-CCNC: 9 U/L (ref 7–52)
ANION GAP SERPL CALCULATED.3IONS-SCNC: 7 MMOL/L (ref 4–13)
AST SERPL W P-5'-P-CCNC: 11 U/L (ref 13–39)
BACTERIA UR QL AUTO: NORMAL /HPF
BASOPHILS # BLD AUTO: 0.01 THOUSANDS/ÂΜL (ref 0–0.1)
BASOPHILS NFR BLD AUTO: 0 % (ref 0–1)
BILIRUB SERPL-MCNC: 0.4 MG/DL (ref 0.2–1)
BILIRUB UR QL STRIP: NEGATIVE
BUN SERPL-MCNC: 12 MG/DL (ref 5–25)
CALCIUM SERPL-MCNC: 9.3 MG/DL (ref 8.4–10.2)
CHLORIDE SERPL-SCNC: 106 MMOL/L (ref 96–108)
CLARITY UR: CLEAR
CO2 SERPL-SCNC: 26 MMOL/L (ref 21–32)
COLOR UR: ABNORMAL
CREAT SERPL-MCNC: 0.61 MG/DL (ref 0.6–1.3)
EOSINOPHIL # BLD AUTO: 0.08 THOUSAND/ÂΜL (ref 0–0.61)
EOSINOPHIL NFR BLD AUTO: 1 % (ref 0–6)
ERYTHROCYTE [DISTWIDTH] IN BLOOD BY AUTOMATED COUNT: 12 % (ref 11.6–15.1)
EXT PREGNANCY TEST URINE: NEGATIVE
EXT. CONTROL: NORMAL
GFR SERPL CREATININE-BSD FRML MDRD: 122 ML/MIN/1.73SQ M
GLUCOSE SERPL-MCNC: 114 MG/DL (ref 65–140)
GLUCOSE UR STRIP-MCNC: NEGATIVE MG/DL
HCT VFR BLD AUTO: 38.4 % (ref 34.8–46.1)
HGB BLD-MCNC: 13.1 G/DL (ref 11.5–15.4)
HGB UR QL STRIP.AUTO: ABNORMAL
IMM GRANULOCYTES # BLD AUTO: 0.04 THOUSAND/UL (ref 0–0.2)
IMM GRANULOCYTES NFR BLD AUTO: 0 % (ref 0–2)
KETONES UR STRIP-MCNC: NEGATIVE MG/DL
LEUKOCYTE ESTERASE UR QL STRIP: NEGATIVE
LIPASE SERPL-CCNC: 14 U/L (ref 11–82)
LYMPHOCYTES # BLD AUTO: 2.25 THOUSANDS/ÂΜL (ref 0.6–4.47)
LYMPHOCYTES NFR BLD AUTO: 17 % (ref 14–44)
MCH RBC QN AUTO: 32.2 PG (ref 26.8–34.3)
MCHC RBC AUTO-ENTMCNC: 34.1 G/DL (ref 31.4–37.4)
MCV RBC AUTO: 94 FL (ref 82–98)
MONOCYTES # BLD AUTO: 0.63 THOUSAND/ÂΜL (ref 0.17–1.22)
MONOCYTES NFR BLD AUTO: 5 % (ref 4–12)
NEUTROPHILS # BLD AUTO: 9.94 THOUSANDS/ÂΜL (ref 1.85–7.62)
NEUTS SEG NFR BLD AUTO: 77 % (ref 43–75)
NITRITE UR QL STRIP: NEGATIVE
NON-SQ EPI CELLS URNS QL MICRO: NORMAL /HPF
NRBC BLD AUTO-RTO: 0 /100 WBCS
PH UR STRIP.AUTO: 6 [PH]
PLATELET # BLD AUTO: 302 THOUSANDS/UL (ref 149–390)
PMV BLD AUTO: 9.1 FL (ref 8.9–12.7)
POTASSIUM SERPL-SCNC: 3.5 MMOL/L (ref 3.5–5.3)
PROT SERPL-MCNC: 6.9 G/DL (ref 6.4–8.4)
PROT UR STRIP-MCNC: NEGATIVE MG/DL
RBC # BLD AUTO: 4.07 MILLION/UL (ref 3.81–5.12)
RBC #/AREA URNS AUTO: NORMAL /HPF
SODIUM SERPL-SCNC: 139 MMOL/L (ref 135–147)
SP GR UR STRIP.AUTO: 1.02 (ref 1–1.03)
UROBILINOGEN UR STRIP-ACNC: <2 MG/DL
WBC # BLD AUTO: 12.95 THOUSAND/UL (ref 4.31–10.16)
WBC #/AREA URNS AUTO: NORMAL /HPF

## 2025-06-17 PROCEDURE — 83690 ASSAY OF LIPASE: CPT

## 2025-06-17 PROCEDURE — 80053 COMPREHEN METABOLIC PANEL: CPT

## 2025-06-17 PROCEDURE — 36415 COLL VENOUS BLD VENIPUNCTURE: CPT

## 2025-06-17 PROCEDURE — 74177 CT ABD & PELVIS W/CONTRAST: CPT

## 2025-06-17 PROCEDURE — 81001 URINALYSIS AUTO W/SCOPE: CPT

## 2025-06-17 PROCEDURE — 85025 COMPLETE CBC W/AUTO DIFF WBC: CPT

## 2025-06-17 PROCEDURE — 99284 EMERGENCY DEPT VISIT MOD MDM: CPT

## 2025-06-17 PROCEDURE — 81025 URINE PREGNANCY TEST: CPT

## 2025-06-17 PROCEDURE — 96374 THER/PROPH/DIAG INJ IV PUSH: CPT

## 2025-06-17 RX ORDER — KETOROLAC TROMETHAMINE 30 MG/ML
15 INJECTION, SOLUTION INTRAMUSCULAR; INTRAVENOUS ONCE
Status: COMPLETED | OUTPATIENT
Start: 2025-06-17 | End: 2025-06-17

## 2025-06-17 RX ADMIN — IOHEXOL 50 ML: 240 INJECTION, SOLUTION INTRATHECAL; INTRAVASCULAR; INTRAVENOUS; ORAL at 22:15

## 2025-06-17 RX ADMIN — IOHEXOL 100 ML: 350 INJECTION, SOLUTION INTRAVENOUS at 23:57

## 2025-06-17 RX ADMIN — KETOROLAC TROMETHAMINE 15 MG: 30 INJECTION, SOLUTION INTRAMUSCULAR; INTRAVENOUS at 20:37

## 2025-06-17 NOTE — TELEPHONE ENCOUNTER
Received VIA MAIL.. Request for medical records..... faxed to [facility]..... & will scan in chart as well....

## 2025-06-18 PROCEDURE — 99285 EMERGENCY DEPT VISIT HI MDM: CPT

## 2025-06-18 RX ORDER — ONDANSETRON 4 MG/1
4 TABLET, FILM COATED ORAL EVERY 6 HOURS
Qty: 12 TABLET | Refills: 0 | Status: SHIPPED | OUTPATIENT
Start: 2025-06-18

## 2025-06-18 NOTE — DISCHARGE INSTRUCTIONS
Your CT scan today was normal. Please continue to follow up with GI and OB/GYN for your pain. May take Zofran as needed for nausea/vomiting.

## 2025-06-19 ENCOUNTER — VBI (OUTPATIENT)
Dept: FAMILY MEDICINE CLINIC | Facility: CLINIC | Age: 30
End: 2025-06-19

## 2025-06-19 NOTE — ED PROVIDER NOTES
Time reflects when diagnosis was documented in both MDM as applicable and the Disposition within this note       Time User Action Codes Description Comment    6/18/2025  1:03 AM Yue Friend Add [R10.84] Generalized abdominal pain           ED Disposition       ED Disposition   Discharge    Condition   Stable    Date/Time   Wed Jun 18, 2025  1:02 AM    Comment   Maricarmen Garcia discharge to home/self care.                   Assessment & Plan       Medical Decision Making  30 year old female presenting for evaluation of abdominal pain and back pain, see HPI. Patient tender particularly in epigastric and RUQ area. On chart review, has had numerous ultrasounds of pelvis, RUQ, and gastric emptying study. Differential diagnosis to include but not limited to: cholecystitis, appendicitis, gastritis, ovarian cyst, endometriosis. Plan for labs, UA, CT A/P.    Labs with mild leukocytosis, otherwise unremarkable. CT A/P with no acute findings. Suspect pain may be due to possible endometriosis or functional given negative imaging. May continue taking OTC pain medication as needed, will send Zofran script. Return precautions provided, patient discharged home to self care.     Amount and/or Complexity of Data Reviewed  Labs: ordered.  Radiology: ordered.    Risk  Prescription drug management.             Medications   ketorolac (TORADOL) injection 15 mg (15 mg Intravenous Given 6/17/25 2037)   iohexol (OMNIPAQUE) 240 MG/ML solution 50 mL (50 mL Oral Given 6/17/25 2215)   iohexol (OMNIPAQUE) 350 MG/ML injection (MULTI-DOSE) 100 mL (100 mL Intravenous Given 6/17/25 2357)       ED Risk Strat Scores                    No data recorded                            History of Present Illness       Chief Complaint   Patient presents with    Abdominal Pain     Middle to lower abd pain starting a few hours ago. Denies N/V/D, but endorses lower back pain. Hx of endometreosis.       Past Medical History[1]   Past Surgical History[2]   Family  History[3]   Social History[4]   E-Cigarette/Vaping    E-Cigarette Use Never User       E-Cigarette/Vaping Substances    Nicotine No     THC No     CBD No     Flavoring No     Other No     Unknown No       I have reviewed and agree with the history as documented.     30 year old female presenting for evaluation of lower abdominal pain for the past few hours as well as lower back pain. Patient states she has had this pain before, has had numerous imaging tests in the past with no clear diagnosis. She believes she has endometriosis, she is scheduled for diagnostic surgery in September. Admits this feels the same as her typical abdominal pain. Denies any nausea, vomiting, diarrhea, does have some lumbar back pain.       Abdominal Pain  Associated symptoms: no chest pain, no chills, no cough, no dysuria, no fever, no hematuria, no nausea, no shortness of breath, no sore throat and no vomiting        Review of Systems   Constitutional:  Negative for chills and fever.   HENT:  Negative for ear pain and sore throat.    Eyes:  Negative for pain and visual disturbance.   Respiratory:  Negative for cough and shortness of breath.    Cardiovascular:  Negative for chest pain and palpitations.   Gastrointestinal:  Positive for abdominal pain. Negative for nausea and vomiting.   Genitourinary:  Negative for dysuria and hematuria.   Musculoskeletal:  Positive for back pain. Negative for arthralgias.   Skin:  Negative for color change and rash.   Neurological:  Negative for seizures and syncope.   All other systems reviewed and are negative.          Objective       ED Triage Vitals   Temperature Pulse Blood Pressure Respirations SpO2 Patient Position - Orthostatic VS   06/17/25 1948 06/17/25 1948 06/17/25 1948 06/17/25 1948 06/17/25 1948 06/17/25 1948   98.8 °F (37.1 °C) 102 143/93 16 97 % Sitting      Temp Source Heart Rate Source BP Location FiO2 (%) Pain Score    06/17/25 1948 06/17/25 1948 06/17/25 1948 -- 06/17/25 2037    Oral  Monitor Right arm  7      Vitals      Date and Time Temp Pulse SpO2 Resp BP Pain Score FACES Pain Rating User   06/17/25 2310 -- 82 99 % 16 113/68 7 -- CG   06/17/25 2037 -- -- -- -- -- 7 -- RC   06/17/25 1948 98.8 °F (37.1 °C) 102 97 % 16 143/93 -- -- RM            Physical Exam  Vitals and nursing note reviewed.   Constitutional:       General: She is not in acute distress.     Appearance: She is well-developed.   HENT:      Head: Normocephalic and atraumatic.     Eyes:      Conjunctiva/sclera: Conjunctivae normal.       Cardiovascular:      Rate and Rhythm: Normal rate and regular rhythm.      Heart sounds: No murmur heard.  Pulmonary:      Effort: Pulmonary effort is normal. No respiratory distress.      Breath sounds: Normal breath sounds.   Abdominal:      Palpations: Abdomen is soft.      Tenderness: There is abdominal tenderness in the right upper quadrant, epigastric area and periumbilical area.     Musculoskeletal:         General: No swelling.      Cervical back: Neck supple.     Skin:     General: Skin is warm and dry.      Capillary Refill: Capillary refill takes less than 2 seconds.     Neurological:      Mental Status: She is alert.     Psychiatric:         Mood and Affect: Mood normal.         Results Reviewed       Procedure Component Value Units Date/Time    Comprehensive metabolic panel [[id number]]  (Abnormal) Collected: 06/17/25 2026    Lab Status: Final result Specimen: Blood from Arm, Left Updated: 06/17/25 2059     Sodium 139 mmol/L      Potassium 3.5 mmol/L      Chloride 106 mmol/L      CO2 26 mmol/L      ANION GAP 7 mmol/L      BUN 12 mg/dL      Creatinine 0.61 mg/dL      Glucose 114 mg/dL      Calcium 9.3 mg/dL      AST 11 U/L      ALT 9 U/L      Alkaline Phosphatase 50 U/L      Total Protein 6.9 g/dL      Albumin 4.1 g/dL      Total Bilirubin 0.40 mg/dL      eGFR 122 ml/min/1.73sq m     Narrative:      National [employer] guidelines for Chronic Kidney Disease (CKD):      Stage 1 with normal or high GFR (GFR > 90 mL/min/1.73 square meters)    Stage 2 Mild CKD (GFR = 60-89 mL/min/1.73 square meters)    Stage 3A Moderate CKD (GFR = 45-59 mL/min/1.73 square meters)    Stage 3B Moderate CKD (GFR = 30-44 mL/min/1.73 square meters)    Stage 4 Severe CKD (GFR = 15-29 mL/min/1.73 square meters)    Stage 5 End Stage CKD (GFR <15 mL/min/1.73 square meters)  Note: GFR calculation is accurate only with a steady state creatinine    Lipase [[id number]]  (Normal) Collected: 06/17/25 2026    Lab Status: Final result Specimen: Blood from Arm, Left Updated: 06/17/25 2059     Lipase 14 u/L     Urine Microscopic [[id number]]  (Normal) Collected: 06/17/25 2034    Lab Status: Final result Specimen: Urine, Clean Catch Updated: 06/17/25 2056     RBC, UA 1-2 /hpf      WBC, UA 1-2 /hpf      Epithelial Cells Occasional /hpf      Bacteria, UA None Seen /hpf     UA w Reflex to Microscopic w Reflex to Culture [[id number]]  (Abnormal) Collected: 06/17/25 2034    Lab Status: Final result Specimen: Urine, Clean Catch Updated: 06/17/25 2055     Color, UA Light Yellow     Clarity, UA Clear     Specific Gravity, UA 1.018     pH, UA 6.0     Leukocytes, UA Negative     Nitrite, UA Negative     Protein, UA Negative mg/dl      Glucose, UA Negative mg/dl      Ketones, UA Negative mg/dl      Urobilinogen, UA <2.0 mg/dl      Bilirubin, UA Negative     Occult Blood, UA Trace    CBC and differential [[id number]]  (Abnormal) Collected: 06/17/25 2026    Lab Status: Final result Specimen: Blood from Arm, Left Updated: 06/17/25 2043     WBC 12.95 Thousand/uL      RBC 4.07 Million/uL      Hemoglobin 13.1 g/dL      Hematocrit 38.4 %      MCV 94 fL      MCH 32.2 pg      MCHC 34.1 g/dL      RDW 12.0 %      MPV 9.1 fL      Platelets 302 Thousands/uL      nRBC 0 /100 WBCs      Segmented % 77 %      Immature Grans % 0 %      Lymphocytes % 17 %      Monocytes % 5 %      Eosinophils Relative 1 %      Basophils Relative 0 %      Absolute  Neutrophils 9.94 Thousands/µL      Absolute Immature Grans 0.04 Thousand/uL      Absolute Lymphocytes 2.25 Thousands/µL      Absolute Monocytes 0.63 Thousand/µL      Eosinophils Absolute 0.08 Thousand/µL      Basophils Absolute 0.01 Thousands/µL     POCT pregnancy, urine [[id number]]  (Normal) Collected: 06/17/25 2036    Lab Status: Final result Updated: 06/17/25 2036     EXT Preg Test, Ur Negative     Control Valid            CT abdomen pelvis with contrast   Final Interpretation by Juan Miguel Eden MD (06/18 0059)      No acute findings in the abdomen or pelvis.         Workstation performed: [id number]             Procedures    ED Medication and Procedure Management   Prior to Admission Medications   Prescriptions Last Dose Informant Patient Reported? Taking?   albuterol (2.5 mg/3 mL) 0.083 % nebulizer solution   No No   Sig: Take 3 mL (2.5 mg total) by nebulization every 6 (six) hours as needed for wheezing or shortness of breath   albuterol (PROVENTIL HFA,VENTOLIN HFA) 90 mcg/act inhaler   No No   Sig: Inhale 2 puffs every 4 (four) hours as needed for wheezing or shortness of breath   benzonatate (TESSALON) 200 MG capsule   Yes No   Sig: take 1 capsule by mouth 3 times a day as needed for cough   budesonide-formoterol (Symbicort) 80-4.5 MCG/ACT inhaler   No No   Sig: Inhale 2 puffs 2 (two) times a day Rinse mouth after use.   Patient not taking: Reported on 5/27/2025   clobetasol (TEMOVATE) 0.05 % external solution   Yes No   Sig: PLEASE SEE ATTACHED FOR DETAILED DIRECTIONS   dicyclomine (BENTYL) 20 mg tablet  Self No No   Sig: Take 1 tablet (20 mg total) by mouth every 6 (six) hours   Patient not taking: Reported on 1/3/2025   drospirenone-ethinyl estradiol (Vestura) 3-0.02 MG per tablet   No No   Sig: Take 1 tablet by mouth daily Take hormonal pills continuously, skipping placebo pills.   famotidine (PEPCID) 40 MG tablet   No No   Sig: TAKE 1 TABLET (40 MG TOTAL) BY MOUTH TWICE A DAY AS NEEDED FOR HEARTBURN    ibuprofen (MOTRIN) 600 mg tablet   No No   Sig: Take 1 tablet (600 mg total) by mouth every 6 (six) hours as needed for mild pain or moderate pain   ketoconazole (NIZORAL) 2 % shampoo   Yes No   methocarbamol (ROBAXIN) 500 mg tablet   No No   Sig: Take 1 tablet (500 mg total) by mouth every 8 (eight) hours as needed for muscle spasms   ondansetron (ZOFRAN) 4 mg tablet   No No   Sig: Take 1 tablet (4 mg total) by mouth every 8 (eight) hours as needed for nausea or vomiting   predniSONE 10 mg tablet   No No   Sig: Take 4 tablets with food on days 1 & 2. Then take 3 tablets with food on days 3 &4. Then take 2 tablets with food on days 5 & 6. Then take 1 tablet with food on days 7 & 8.   Patient not taking: Reported on 1/29/2025   predniSONE 20 mg tablet   Yes No   Sig: TAKE 3 TABLETS DAILY FOR 5 DAYS   promethazine-dextromethorphan (PHENERGAN-DM) 6.25-15 mg/5 mL oral syrup   Yes No   Sig: TAKE 5 MLS BY MOUTH EVERY 6 HOURS AS NEEDED FOR COUGH      Facility-Administered Medications: None     Discharge Medication List as of 6/18/2025  1:08 AM        START taking these medications    Details   !! ondansetron (ZOFRAN) 4 mg tablet Take 1 tablet (4 mg total) by mouth every 6 (six) hours, Starting Wed 6/18/2025, Normal       !! - Potential duplicate medications found. Please discuss with provider.        CONTINUE these medications which have NOT CHANGED    Details   albuterol (2.5 mg/3 mL) 0.083 % nebulizer solution Take 3 mL (2.5 mg total) by nebulization every 6 (six) hours as needed for wheezing or shortness of breath, Starting Mon 10/14/2024, Normal      albuterol (PROVENTIL HFA,VENTOLIN HFA) 90 mcg/act inhaler Inhale 2 puffs every 4 (four) hours as needed for wheezing or shortness of breath, Starting Mon 10/14/2024, Normal      benzonatate (TESSALON) 200 MG capsule take 1 capsule by mouth 3 times a day as needed for cough, Historical Med      budesonide-formoterol (Symbicort) 80-4.5 MCG/ACT inhaler Inhale 2 puffs 2  (two) times a day Rinse mouth after use., Starting Mon 10/14/2024, Normal      clobetasol (TEMOVATE) 0.05 % external solution PLEASE SEE ATTACHED FOR DETAILED DIRECTIONS, Historical Med      dicyclomine (BENTYL) 20 mg tablet Take 1 tablet (20 mg total) by mouth every 6 (six) hours, Starting Tue 7/2/2024, Normal      drospirenone-ethinyl estradiol (Vestura) 3-0.02 MG per tablet Take 1 tablet by mouth daily Take hormonal pills continuously, skipping placebo pills., Starting Wed 3/12/2025, Normal      famotidine (PEPCID) 40 MG tablet TAKE 1 TABLET (40 MG TOTAL) BY MOUTH TWICE A DAY AS NEEDED FOR HEARTBURN, Normal      ibuprofen (MOTRIN) 600 mg tablet Take 1 tablet (600 mg total) by mouth every 6 (six) hours as needed for mild pain or moderate pain, Starting Fri 1/3/2025, Normal      ketoconazole (NIZORAL) 2 % shampoo Historical Med      methocarbamol (ROBAXIN) 500 mg tablet Take 1 tablet (500 mg total) by mouth every 8 (eight) hours as needed for muscle spasms, Starting Thu 5/8/2025, Normal      !! ondansetron (ZOFRAN) 4 mg tablet Take 1 tablet (4 mg total) by mouth every 8 (eight) hours as needed for nausea or vomiting, Starting Thu 5/8/2025, Normal      !! predniSONE 10 mg tablet Take 4 tablets with food on days 1 & 2. Then take 3 tablets with food on days 3 &4. Then take 2 tablets with food on days 5 & 6. Then take 1 tablet with food on days 7 & 8., Normal      !! predniSONE 20 mg tablet TAKE 3 TABLETS DAILY FOR 5 DAYS, Historical Med      promethazine-dextromethorphan (PHENERGAN-DM) 6.25-15 mg/5 mL oral syrup TAKE 5 MLS BY MOUTH EVERY 6 HOURS AS NEEDED FOR COUGH, Historical Med       !! - Potential duplicate medications found. Please discuss with provider.        No discharge procedures on file.  ED SEPSIS DOCUMENTATION   Time reflects when diagnosis was documented in both MDM as applicable and the Disposition within this note       Time User Action Codes Description Comment    6/18/2025  1:03 AM Yue Friend Add  [R10.84] Generalized abdominal pain                    [1]   Past Medical History:  Diagnosis Date    Anemia     Arthritis     Asthma     Endometriosis 10/19/2023    Lupus     8 years    No known problems    [2]   Past Surgical History:  Procedure Laterality Date    ABDOMINAL SURGERY  2022    Csection     SECTION       SECTION      NO PAST SURGERIES      US GUIDED MASS BIOPSY (UNSPECIFIED) Right 2024    Foot biopsy    WISDOM TOOTH EXTRACTION     [3]   Family History  Problem Relation Name Age of Onset    Asthma Mother Charissa walker     Other Mother Charissa walker         gestational lupus    Hypertension Mother Charissa walker     Kidney disease Mother Charissa walker     Miscarriages / Stillbirths Mother Charissa walker     Mental illness Maternal Grandmother Leia     Bipolar disorder Maternal Grandmother Leia     Migraines Maternal Grandmother Leia     Alcohol abuse Paternal Grandfather      Breast cancer Neg Hx      Colon cancer Neg Hx      Substance Abuse Neg Hx     [4]   Social History  Tobacco Use    Smoking status: Never    Smokeless tobacco: Never   Vaping Use    Vaping status: Never Used   Substance Use Topics    Alcohol use: No    Drug use: No        Yue Friend PA-C  25 [phone]

## 2025-06-19 NOTE — TELEPHONE ENCOUNTER
06/19/25 3:10 PM    Patient contacted post ED visit, [facility] department spoke with patient/caregiver and outreach was successful.    Thank you.  John Petty MA  [facility]

## 2025-06-24 ENCOUNTER — TELEPHONE (OUTPATIENT)
Dept: OBGYN CLINIC | Facility: CLINIC | Age: 30
End: 2025-06-24

## 2025-07-22 ENCOUNTER — PATIENT MESSAGE (OUTPATIENT)
Dept: OBGYN CLINIC | Facility: CLINIC | Age: 30
End: 2025-07-22

## 2025-07-22 ENCOUNTER — OFFICE VISIT (OUTPATIENT)
Dept: OBGYN CLINIC | Facility: CLINIC | Age: 30
End: 2025-07-22
Payer: COMMERCIAL

## 2025-07-22 VITALS — BODY MASS INDEX: 26.66 KG/M2 | DIASTOLIC BLOOD PRESSURE: 70 MMHG | SYSTOLIC BLOOD PRESSURE: 116 MMHG | WEIGHT: 132 LBS

## 2025-07-22 DIAGNOSIS — N94.10 DYSPAREUNIA, FEMALE: ICD-10-CM

## 2025-07-22 DIAGNOSIS — R10.2 PELVIC PAIN: Primary | ICD-10-CM

## 2025-07-22 PROCEDURE — 99214 OFFICE O/P EST MOD 30 MIN: CPT | Performed by: STUDENT IN AN ORGANIZED HEALTH CARE EDUCATION/TRAINING PROGRAM

## 2025-07-22 RX ORDER — NYSTATIN 100000 [USP'U]/G
POWDER TOPICAL DAILY
Qty: 30 G | Refills: 0 | Status: SHIPPED | OUTPATIENT
Start: 2025-07-22 | End: 2025-07-22 | Stop reason: CLARIF

## 2025-07-22 NOTE — Clinical Note
Nicole Jurado,   I saw this patient in the office today and she is interested in dx lap for diagnosis and treatment of endo. She would like to have surgery in August but I am away for two week with no surgical time that month when I return. Her  is a teacher which is why she prefers the end of the summer. I checked with Junie who said you might have some time available. The patient is willing to see you for preop if you think you can do her surgery in August. Let me know your thoughts!  Cris

## 2025-07-22 NOTE — PROGRESS NOTES
Name: Maricarmen Garcia      : 1995      MRN: [id number]  Encounter Provider: Cris Paniagua MD  Encounter Date: 2025   Encounter department: [facility]  :  Assessment & Plan  Pelvic pain  - has history of pelvic pain with heavy periods   - currently managing with continuous OCPs- has had improvement in bleeding but not pain   - concerned about endo, does not have bx proven endo but has long history of concerning sxs with evidence of adeno on US   - reviewed surgical management options including dx lap for confirmation and resection of endo vs hysterectomy for pelvic pain. Patent opts for dx lap   - recommend allergy testing to evaluate for food allergies that may be contributing to symptoms        Dyspareunia, female  - consider pelvic PT   Orders:    Ambulatory Referral to Physical Therapy; Future        History of Present Illness     Maricarmen Garcia is a 30 y.o. female who presents with pelvic pain, bloating, back pain and leg soreness. She also reports pain with intercourse - has been unable to be intimate recently. She also reports a long history of emesis and fevers with menses. Has been struggling with menses since menarche. She reports a suspected diagnosis of endometriosis. US on 2025 notable for evidence of adeno. She is currently managing on continuous OCPs-  has amenorrhea, but still has persistent pelvic pain. She is interested in surgical management.     She has been worked up for other causes of abdominal pain and back pain with the following results:     CT scans (most recently 23)  HIDA scan negative (6/6/15)   Gastric emptying scan normal (25)   RUQ US  for RUQ pain was normal  EGD in  wnl with neg bx.   Surgical hx is notable for CD x 2- patient was not told that endo was noted at time of surgery     History obtained from: patient    Review of Systems   All other systems reviewed and are negative.         Objective   /70 (BP Location: Left  arm, Patient Position: Sitting, Cuff Size: Standard)   Wt 59.9 kg (132 lb)   LMP  (LMP Unknown)   BMI 26.66 kg/m²      Physical Exam  Vitals and nursing note reviewed.   Constitutional:       General: She is not in acute distress.     Appearance: She is well-developed.   HENT:      Head: Normocephalic and atraumatic.     Eyes:      Conjunctiva/sclera: Conjunctivae normal.       Cardiovascular:      Rate and Rhythm: Normal rate.      Heart sounds: No murmur heard.  Pulmonary:      Effort: Pulmonary effort is normal. No respiratory distress.   Abdominal:      Palpations: Abdomen is soft.      Tenderness: There is no abdominal tenderness.     Musculoskeletal:         General: No swelling.      Cervical back: Neck supple.     Skin:     General: Skin is warm and dry.      Capillary Refill: Capillary refill takes less than 2 seconds.     Neurological:      Mental Status: She is alert.     Psychiatric:         Mood and Affect: Mood normal.

## 2025-07-23 NOTE — PATIENT COMMUNICATION
Patient calling to clarify process for surgery scheduling. Advised  will reach out to discuss dates. 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 States would be okay with any of Dr. Paniagua's colleagues.    Patient also inquiring about pelvic floor PT order. Advised it looks like it was placed if she'd like to try for dyspareunia. Patient states she will consider.

## 2025-07-25 ENCOUNTER — TELEPHONE (OUTPATIENT)
Age: 30
End: 2025-07-25

## 2025-07-25 NOTE — TELEPHONE ENCOUNTER
[facility] rep called to confirm if we received the patient medical records request . I tried to explain to the rep the patient  is not established with Pulmonary .

## 2025-07-28 DIAGNOSIS — N92.1 BREAKTHROUGH BLEEDING ON OCPS: ICD-10-CM

## 2025-07-28 DIAGNOSIS — N94.6 DYSMENORRHEA: ICD-10-CM

## 2025-07-28 RX ORDER — DROSPIRENONE AND ETHINYL ESTRADIOL 0.02-3(28)
1 KIT ORAL DAILY
Qty: 84 TABLET | Refills: 0 | Status: CANCELLED | OUTPATIENT
Start: 2025-07-28

## 2025-07-29 NOTE — PATIENT COMMUNICATION
Incoming call from patient - states that she has not heard from office yet regarding scheduling surgery. Is checking in on this.     Additionally patient is requesting refill of ocp, Vestura to be sent in to pharmacy on file.     Routing to provider for review.

## 2025-07-30 ENCOUNTER — TELEPHONE (OUTPATIENT)
Age: 30
End: 2025-07-30

## 2025-07-30 ENCOUNTER — TELEPHONE (OUTPATIENT)
Dept: OBGYN CLINIC | Facility: CLINIC | Age: 30
End: 2025-07-30

## 2025-07-30 DIAGNOSIS — N92.1 BREAKTHROUGH BLEEDING ON OCPS: ICD-10-CM

## 2025-07-30 DIAGNOSIS — N94.6 DYSMENORRHEA: ICD-10-CM

## 2025-07-30 RX ORDER — DROSPIRENONE AND ETHINYL ESTRADIOL 0.02-3(28)
1 KIT ORAL DAILY
Qty: 84 TABLET | Refills: 1 | Status: SHIPPED | OUTPATIENT
Start: 2025-07-30

## 2025-08-01 ENCOUNTER — TELEPHONE (OUTPATIENT)
Dept: OBGYN CLINIC | Facility: CLINIC | Age: 30
End: 2025-08-01

## 2025-08-04 ENCOUNTER — TELEPHONE (OUTPATIENT)
Dept: OBGYN CLINIC | Facility: CLINIC | Age: 30
End: 2025-08-04

## 2025-08-14 ENCOUNTER — ANESTHESIA EVENT (OUTPATIENT)
Dept: PERIOP | Facility: HOSPITAL | Age: 30
End: 2025-08-14
Payer: COMMERCIAL

## 2025-08-15 ENCOUNTER — ANESTHESIA (OUTPATIENT)
Dept: PERIOP | Facility: HOSPITAL | Age: 30
End: 2025-08-15
Payer: COMMERCIAL

## 2025-08-15 ENCOUNTER — HOSPITAL ENCOUNTER (OUTPATIENT)
Facility: HOSPITAL | Age: 30
Setting detail: OUTPATIENT SURGERY
Discharge: HOME | End: 2025-08-15
Attending: STUDENT IN AN ORGANIZED HEALTH CARE EDUCATION/TRAINING PROGRAM | Admitting: STUDENT IN AN ORGANIZED HEALTH CARE EDUCATION/TRAINING PROGRAM
Payer: COMMERCIAL

## 2025-08-15 PROBLEM — Z98.890 STATUS POST LAPAROSCOPY: Status: ACTIVE | Noted: 2025-08-15

## 2025-08-16 ENCOUNTER — NURSE TRIAGE (OUTPATIENT)
Dept: OTHER | Facility: OTHER | Age: 30
End: 2025-08-16

## 2025-08-17 ENCOUNTER — NURSE TRIAGE (OUTPATIENT)
Dept: OTHER | Facility: OTHER | Age: 30
End: 2025-08-17

## 2025-08-18 ENCOUNTER — TELEPHONE (OUTPATIENT)
Age: 30
End: 2025-08-18

## 2025-08-18 ENCOUNTER — TELEPHONE (OUTPATIENT)
Dept: FAMILY MEDICINE CLINIC | Facility: CLINIC | Age: 30
End: 2025-08-18